# Patient Record
Sex: MALE | Race: WHITE | NOT HISPANIC OR LATINO | Employment: FULL TIME | URBAN - METROPOLITAN AREA
[De-identification: names, ages, dates, MRNs, and addresses within clinical notes are randomized per-mention and may not be internally consistent; named-entity substitution may affect disease eponyms.]

---

## 2018-09-12 ENCOUNTER — TELEPHONE (OUTPATIENT)
Dept: OBSTETRICS AND GYNECOLOGY | Facility: CLINIC | Age: 38
End: 2018-09-12

## 2018-09-12 DIAGNOSIS — N46.9 INFERTILITY MALE: Primary | ICD-10-CM

## 2018-09-21 ENCOUNTER — OFFICE VISIT (OUTPATIENT)
Dept: FAMILY MEDICINE | Facility: CLINIC | Age: 38
End: 2018-09-21
Payer: COMMERCIAL

## 2018-09-21 ENCOUNTER — PATIENT OUTREACH (OUTPATIENT)
Dept: ADMINISTRATIVE | Facility: HOSPITAL | Age: 38
End: 2018-09-21

## 2018-09-21 VITALS
OXYGEN SATURATION: 99 % | HEART RATE: 96 BPM | BODY MASS INDEX: 30.08 KG/M2 | SYSTOLIC BLOOD PRESSURE: 122 MMHG | WEIGHT: 259.94 LBS | TEMPERATURE: 96 F | DIASTOLIC BLOOD PRESSURE: 80 MMHG | HEIGHT: 78 IN

## 2018-09-21 DIAGNOSIS — E66.9 OBESITY (BMI 30-39.9): ICD-10-CM

## 2018-09-21 DIAGNOSIS — Z13.6 SCREENING FOR ISCHEMIC HEART DISEASE: ICD-10-CM

## 2018-09-21 DIAGNOSIS — Z00.00 ENCOUNTER FOR ANNUAL PHYSICAL EXAM: Primary | ICD-10-CM

## 2018-09-21 DIAGNOSIS — Z72.0 TOBACCO ABUSE: ICD-10-CM

## 2018-09-21 PROCEDURE — 99999 PR PBB SHADOW E&M-EST. PATIENT-LVL III: CPT | Mod: PBBFAC,,, | Performed by: FAMILY MEDICINE

## 2018-09-21 PROCEDURE — 90471 IMMUNIZATION ADMIN: CPT | Mod: S$GLB,,, | Performed by: FAMILY MEDICINE

## 2018-09-21 PROCEDURE — 99385 PREV VISIT NEW AGE 18-39: CPT | Mod: 25,S$GLB,, | Performed by: FAMILY MEDICINE

## 2018-09-21 PROCEDURE — 90686 IIV4 VACC NO PRSV 0.5 ML IM: CPT | Mod: S$GLB,,, | Performed by: FAMILY MEDICINE

## 2018-09-21 RX ORDER — BUPROPION HYDROCHLORIDE 150 MG/1
150 TABLET, EXTENDED RELEASE ORAL 2 TIMES DAILY
Qty: 60 TABLET | Refills: 1 | Status: SHIPPED | OUTPATIENT
Start: 2018-09-21 | End: 2019-05-06

## 2018-09-21 NOTE — PROGRESS NOTES
Subjective:       Patient ID: Billy Braga is a 37 y.o. male.    Chief Complaint: Follow-up      HPI Comments:       Current Outpatient Medications:     buPROPion (WELLBUTRIN SR) 150 MG TBSR 12 hr tablet, Take 1 tablet (150 mg total) by mouth 2 (two) times daily., Disp: 60 tablet, Rfl: 1      Encouraged by his wife to establish care for a well visit.  Current smoker.  Quit before with Wellbutrin would like to try again.  No other significant complaints at this time.    Past medical history no hospitalizations or surgery.  No current medications other than occasional melatonin, unknown dose.  Has insomnia that lasts all night about 2 to 3 times a month.  No connection with caffeine use or stresses/worries.    Lost 70 lb a few years ago on acute on diet.  Has gained about 30 back since then.  Exercises intermittently.   with 1 6-year-old son.    Family history father had colon cancer at age 65.  Grandfathers with prostatic and pancreatic cancers grandmother and mother with diabetes.  No coronary artery disease. Siblings with alcoholism    Smokes 1 pack per day.  No alcohol.  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     No urinary symptoms.  No abnormal bowel movements or blood in stool.  No abnormal breathing patterns while sleeping.  Rested in the morning on most days.      Review of Systems   Constitutional: Negative for activity change and unexpected weight change.   HENT: Negative for hearing loss, rhinorrhea and trouble swallowing.    Eyes: Negative for discharge and visual disturbance.   Respiratory: Negative for chest tightness and wheezing.    Cardiovascular: Negative for chest pain and palpitations.   Gastrointestinal: Negative for blood in stool, constipation, diarrhea and vomiting.   Endocrine: Negative for polydipsia and polyuria.   Genitourinary: Negative for difficulty urinating, hematuria and urgency.   Musculoskeletal: Negative for arthralgias, joint swelling and neck pain.  "  Neurological: Negative for weakness and headaches.   Psychiatric/Behavioral: Positive for sleep disturbance. Negative for confusion and dysphoric mood.       Objective:      Vitals:    09/21/18 0826   BP: 122/80   Pulse: 96   Temp: 96.3 °F (35.7 °C)   SpO2: 99%   Weight: 117.9 kg (259 lb 14.8 oz)   Height: 6' 6" (1.981 m)   PainSc: 0-No pain     Physical Exam   Constitutional: He is oriented to person, place, and time. He appears well-developed and well-nourished. No distress.   HENT:   Head: Normocephalic.   Mouth/Throat: No oropharyngeal exudate.   Neck: Neck supple. No thyromegaly present.   Cardiovascular: Normal rate, regular rhythm and normal heart sounds.   No murmur heard.  Pulmonary/Chest: Effort normal and breath sounds normal. He has no wheezes. He has no rales.   Abdominal: Soft. He exhibits no distension.   Musculoskeletal: He exhibits no edema.   Lymphadenopathy:     He has no cervical adenopathy.   Neurological: He is alert and oriented to person, place, and time.   Skin: Skin is warm and dry. He is not diaphoretic.   Psychiatric: He has a normal mood and affect. His behavior is normal. Judgment and thought content normal.   Nursing note and vitals reviewed.      Assessment:       1. Encounter for annual physical exam    2. Tobacco abuse    3. Obesity (BMI 30-39.9)    4. Screening for ischemic heart disease        Plan:   Encounter for annual physical exam  Comments:  CBC, CMP, TSH.  Flu shot.  Father was 65 when he was diagnosed with colon cancer:  Initial screening colonoscopy at age 51  Orders:  -     Comprehensive metabolic panel; Future; Expected date: 09/21/2018  -     CBC auto differential; Future; Expected date: 09/21/2018  -     TSH; Future; Expected date: 09/21/2018    Tobacco abuse  Comments:  Wellbutrin 150 b.i.d.    Obesity (BMI 30-39.9)  Comments:  recommended attention to diet and weight to keep BMI less than 30    Screening for ischemic heart disease  Comments:  Lipid " profile  Orders:  -     Lipid panel; Future; Expected date: 09/21/2018    Other orders  -     buPROPion (WELLBUTRIN SR) 150 MG TBSR 12 hr tablet; Take 1 tablet (150 mg total) by mouth 2 (two) times daily.  Dispense: 60 tablet; Refill: 1

## 2018-09-28 ENCOUNTER — LAB VISIT (OUTPATIENT)
Dept: LAB | Facility: HOSPITAL | Age: 38
End: 2018-09-28
Attending: FAMILY MEDICINE
Payer: COMMERCIAL

## 2018-09-28 DIAGNOSIS — Z00.00 ENCOUNTER FOR ANNUAL PHYSICAL EXAM: ICD-10-CM

## 2018-09-28 DIAGNOSIS — Z13.6 SCREENING FOR ISCHEMIC HEART DISEASE: ICD-10-CM

## 2018-09-28 LAB
ALBUMIN SERPL BCP-MCNC: 3.9 G/DL
ALP SERPL-CCNC: 72 U/L
ALT SERPL W/O P-5'-P-CCNC: 32 U/L
ANION GAP SERPL CALC-SCNC: 9 MMOL/L
AST SERPL-CCNC: 60 U/L
BASOPHILS # BLD AUTO: 0.03 K/UL
BASOPHILS NFR BLD: 0.4 %
BILIRUB SERPL-MCNC: 1.6 MG/DL
BUN SERPL-MCNC: 16 MG/DL
CALCIUM SERPL-MCNC: 9.3 MG/DL
CHLORIDE SERPL-SCNC: 108 MMOL/L
CHOLEST SERPL-MCNC: 212 MG/DL
CHOLEST/HDLC SERPL: 4.5 {RATIO}
CO2 SERPL-SCNC: 24 MMOL/L
CREAT SERPL-MCNC: 1 MG/DL
DIFFERENTIAL METHOD: ABNORMAL
EOSINOPHIL # BLD AUTO: 0.3 K/UL
EOSINOPHIL NFR BLD: 3.4 %
ERYTHROCYTE [DISTWIDTH] IN BLOOD BY AUTOMATED COUNT: 12.7 %
EST. GFR  (AFRICAN AMERICAN): >60 ML/MIN/1.73 M^2
EST. GFR  (NON AFRICAN AMERICAN): >60 ML/MIN/1.73 M^2
GLUCOSE SERPL-MCNC: 91 MG/DL
HCT VFR BLD AUTO: 46.8 %
HDLC SERPL-MCNC: 47 MG/DL
HDLC SERPL: 22.2 %
HGB BLD-MCNC: 15.8 G/DL
IMM GRANULOCYTES # BLD AUTO: 0.02 K/UL
IMM GRANULOCYTES NFR BLD AUTO: 0.3 %
LDLC SERPL CALC-MCNC: 144 MG/DL
LYMPHOCYTES # BLD AUTO: 2 K/UL
LYMPHOCYTES NFR BLD: 26 %
MCH RBC QN AUTO: 31.2 PG
MCHC RBC AUTO-ENTMCNC: 33.8 G/DL
MCV RBC AUTO: 93 FL
MONOCYTES # BLD AUTO: 0.4 K/UL
MONOCYTES NFR BLD: 5.5 %
NEUTROPHILS # BLD AUTO: 5 K/UL
NEUTROPHILS NFR BLD: 64.4 %
NONHDLC SERPL-MCNC: 165 MG/DL
NRBC BLD-RTO: 0 /100 WBC
PLATELET # BLD AUTO: 234 K/UL
PMV BLD AUTO: 11.4 FL
POTASSIUM SERPL-SCNC: 4 MMOL/L
PROT SERPL-MCNC: 7.3 G/DL
RBC # BLD AUTO: 5.06 M/UL
SODIUM SERPL-SCNC: 141 MMOL/L
TRIGL SERPL-MCNC: 105 MG/DL
TSH SERPL DL<=0.005 MIU/L-ACNC: 1.09 UIU/ML
WBC # BLD AUTO: 7.69 K/UL

## 2018-09-28 PROCEDURE — 36415 COLL VENOUS BLD VENIPUNCTURE: CPT | Mod: PO

## 2018-09-28 PROCEDURE — 80061 LIPID PANEL: CPT

## 2018-09-28 PROCEDURE — 80053 COMPREHEN METABOLIC PANEL: CPT

## 2018-09-28 PROCEDURE — 85025 COMPLETE CBC W/AUTO DIFF WBC: CPT

## 2018-09-28 PROCEDURE — 84443 ASSAY THYROID STIM HORMONE: CPT

## 2019-05-06 ENCOUNTER — OFFICE VISIT (OUTPATIENT)
Dept: FAMILY MEDICINE | Facility: CLINIC | Age: 39
End: 2019-05-06
Payer: COMMERCIAL

## 2019-05-06 VITALS
BODY MASS INDEX: 32.3 KG/M2 | HEART RATE: 87 BPM | SYSTOLIC BLOOD PRESSURE: 134 MMHG | OXYGEN SATURATION: 96 % | WEIGHT: 279.56 LBS | DIASTOLIC BLOOD PRESSURE: 84 MMHG | TEMPERATURE: 100 F

## 2019-05-06 DIAGNOSIS — L30.9 DERMATITIS: Primary | ICD-10-CM

## 2019-05-06 DIAGNOSIS — L03.116 CELLULITIS OF LEFT THIGH: ICD-10-CM

## 2019-05-06 PROCEDURE — 99213 PR OFFICE/OUTPT VISIT, EST, LEVL III, 20-29 MIN: ICD-10-PCS | Mod: S$GLB,,, | Performed by: NURSE PRACTITIONER

## 2019-05-06 PROCEDURE — 99213 OFFICE O/P EST LOW 20 MIN: CPT | Mod: S$GLB,,, | Performed by: NURSE PRACTITIONER

## 2019-05-06 PROCEDURE — 99999 PR PBB SHADOW E&M-EST. PATIENT-LVL III: ICD-10-PCS | Mod: PBBFAC,,, | Performed by: NURSE PRACTITIONER

## 2019-05-06 PROCEDURE — 99999 PR PBB SHADOW E&M-EST. PATIENT-LVL III: CPT | Mod: PBBFAC,,, | Performed by: NURSE PRACTITIONER

## 2019-05-06 RX ORDER — IBUPROFEN 800 MG/1
800 TABLET ORAL 3 TIMES DAILY
Qty: 30 TABLET | Refills: 1 | Status: SHIPPED | OUTPATIENT
Start: 2019-05-06 | End: 2020-08-28

## 2019-05-06 RX ORDER — SULFAMETHOXAZOLE AND TRIMETHOPRIM 800; 160 MG/1; MG/1
1 TABLET ORAL 2 TIMES DAILY
Qty: 14 TABLET | Refills: 0 | Status: SHIPPED | OUTPATIENT
Start: 2019-05-06 | End: 2020-08-28

## 2019-05-09 ENCOUNTER — OFFICE VISIT (OUTPATIENT)
Dept: FAMILY MEDICINE | Facility: CLINIC | Age: 39
End: 2019-05-09
Payer: COMMERCIAL

## 2019-05-09 VITALS
BODY MASS INDEX: 32.41 KG/M2 | DIASTOLIC BLOOD PRESSURE: 75 MMHG | OXYGEN SATURATION: 99 % | SYSTOLIC BLOOD PRESSURE: 144 MMHG | TEMPERATURE: 99 F | WEIGHT: 280.44 LBS | HEART RATE: 75 BPM

## 2019-05-09 DIAGNOSIS — L03.116 CELLULITIS OF LEFT THIGH: Primary | ICD-10-CM

## 2019-05-09 DIAGNOSIS — I83.12 VARICOSE VEINS OF BOTH LOWER EXTREMITIES WITH INFLAMMATION: ICD-10-CM

## 2019-05-09 DIAGNOSIS — Z72.0 TOBACCO ABUSE: ICD-10-CM

## 2019-05-09 DIAGNOSIS — I83.11 VARICOSE VEINS OF BOTH LOWER EXTREMITIES WITH INFLAMMATION: ICD-10-CM

## 2019-05-09 PROCEDURE — 99999 PR PBB SHADOW E&M-EST. PATIENT-LVL III: CPT | Mod: PBBFAC,,, | Performed by: NURSE PRACTITIONER

## 2019-05-09 PROCEDURE — 99213 PR OFFICE/OUTPT VISIT, EST, LEVL III, 20-29 MIN: ICD-10-PCS | Mod: S$GLB,,, | Performed by: NURSE PRACTITIONER

## 2019-05-09 PROCEDURE — 99213 OFFICE O/P EST LOW 20 MIN: CPT | Mod: S$GLB,,, | Performed by: NURSE PRACTITIONER

## 2019-05-09 PROCEDURE — 99999 PR PBB SHADOW E&M-EST. PATIENT-LVL III: ICD-10-PCS | Mod: PBBFAC,,, | Performed by: NURSE PRACTITIONER

## 2019-05-11 NOTE — PROGRESS NOTES
Subjective:       Patient ID: Billy Braga is a 38 y.o. male.    Chief Complaint: Rash  Pt reports to clinic with chief complaint of painful rash to right upper thigh.  Onset 3 days ago, pain worsening and awakening from sleep.  Denies injury or trauma.  No contact with new chemicals, plants or animals.    HPI  Review of Systems   Constitutional: Negative.    Respiratory: Negative.    Cardiovascular: Negative.    Gastrointestinal: Negative.    Genitourinary: Negative.    Musculoskeletal: Positive for myalgias.   Skin: Positive for color change and rash.       Objective:      Physical Exam   Constitutional: He is oriented to person, place, and time. He appears well-developed and well-nourished.   HENT:   Head: Normocephalic.   Eyes: EOM are normal.   Neck: Neck supple.   Cardiovascular: Normal rate and normal heart sounds.   Pulmonary/Chest: Effort normal and breath sounds normal.   Neurological: He is alert and oriented to person, place, and time.   Skin: There is erythema.        Varicose veins, erythematous, warm to touch, no induration    Vitals reviewed.      Assessment:       1. Dermatitis    2. Cellulitis of left thigh        Plan:   Dermatitis  -     ibuprofen (ADVIL,MOTRIN) 800 MG tablet; Take 1 tablet (800 mg total) by mouth 3 (three) times daily.  Dispense: 30 tablet; Refill: 1  -     sulfamethoxazole-trimethoprim 800-160mg (BACTRIM DS) 800-160 mg Tab; Take 1 tablet by mouth 2 (two) times daily.  Dispense: 14 tablet; Refill: 0    Cellulitis of left thigh  -     ibuprofen (ADVIL,MOTRIN) 800 MG tablet; Take 1 tablet (800 mg total) by mouth 3 (three) times daily.  Dispense: 30 tablet; Refill: 1  -     sulfamethoxazole-trimethoprim 800-160mg (BACTRIM DS) 800-160 mg Tab; Take 1 tablet by mouth 2 (two) times daily.  Dispense: 14 tablet; Refill: 0      Follow up in about 3 days (around 5/9/2019).

## 2019-05-11 NOTE — PROGRESS NOTES
Subjective:       Patient ID: Billy Braga is a 38 y.o. male.    Chief Complaint: Follow-up  pt reports to clinic for follow up evaluation.  Treated 3 days ago for cellulitis and varicose veins inflammation.  Pt reports erythema has decreased.  Denies fever chills.    HPI  Review of Systems   Constitutional: Negative for chills and fever.   HENT: Negative.    Respiratory: Negative.    Cardiovascular: Negative.    Gastrointestinal: Negative.    Skin: Positive for color change.       Objective:      Physical Exam   Constitutional: He is oriented to person, place, and time. He appears well-developed and well-nourished.   HENT:   Head: Normocephalic.   Eyes: EOM are normal.   Neck: Neck supple.   Cardiovascular: Normal rate and normal heart sounds.   Pulmonary/Chest: Effort normal and breath sounds normal.   Neurological: He is alert and oriented to person, place, and time.   Skin:        Decreased erythema from previous, cool to touch, non tender, varicose veins   Vitals reviewed.      Assessment:       1. Cellulitis of left thigh    2. Varicose veins of both lower extremities with inflammation        Plan:   Cellulitis of left thigh  Improving. Complete bactrim.    Varicose veins of both lower extremities with inflammation  Compression stockings advised.  Follow up with PCP for vascular evaluation     No follow-ups on file.

## 2019-10-25 ENCOUNTER — CLINICAL SUPPORT (OUTPATIENT)
Dept: FAMILY MEDICINE | Facility: CLINIC | Age: 39
End: 2019-10-25
Payer: COMMERCIAL

## 2019-10-25 ENCOUNTER — HOSPITAL ENCOUNTER (OUTPATIENT)
Dept: RADIOLOGY | Facility: HOSPITAL | Age: 39
Discharge: HOME | End: 2019-10-25
Attending: FAMILY MEDICINE
Payer: COMMERCIAL

## 2019-10-25 ENCOUNTER — OFFICE VISIT (OUTPATIENT)
Dept: FAMILY MEDICINE | Facility: CLINIC | Age: 39
End: 2019-10-25
Payer: COMMERCIAL

## 2019-10-25 VITALS
HEART RATE: 77 BPM | WEIGHT: 277.88 LBS | OXYGEN SATURATION: 98 % | BODY MASS INDEX: 32.15 KG/M2 | SYSTOLIC BLOOD PRESSURE: 139 MMHG | HEIGHT: 78 IN | TEMPERATURE: 98 F | DIASTOLIC BLOOD PRESSURE: 89 MMHG

## 2019-10-25 DIAGNOSIS — G89.29 CHRONIC RIGHT HIP PAIN: Primary | ICD-10-CM

## 2019-10-25 DIAGNOSIS — E66.9 OBESITY (BMI 30-39.9): ICD-10-CM

## 2019-10-25 DIAGNOSIS — Z72.0 TOBACCO ABUSE: ICD-10-CM

## 2019-10-25 DIAGNOSIS — Z00.00 ENCOUNTER FOR ANNUAL PHYSICAL EXAM: Primary | ICD-10-CM

## 2019-10-25 DIAGNOSIS — Z23 NEED FOR VACCINATION: ICD-10-CM

## 2019-10-25 DIAGNOSIS — M25.551 RIGHT HIP PAIN: ICD-10-CM

## 2019-10-25 DIAGNOSIS — M25.551 CHRONIC RIGHT HIP PAIN: Primary | ICD-10-CM

## 2019-10-25 DIAGNOSIS — R17 ELEVATED BILIRUBIN: ICD-10-CM

## 2019-10-25 DIAGNOSIS — E78.5 HYPERLIPIDEMIA, UNSPECIFIED HYPERLIPIDEMIA TYPE: ICD-10-CM

## 2019-10-25 PROCEDURE — 90686 FLU VACCINE (QUAD) GREATER THAN OR EQUAL TO 3YO PRESERVATIVE FREE IM: ICD-10-PCS | Mod: S$GLB,,, | Performed by: FAMILY MEDICINE

## 2019-10-25 PROCEDURE — 73521 X-RAY EXAM HIPS BI 2 VIEWS: CPT | Mod: TC,PO

## 2019-10-25 PROCEDURE — 90686 IIV4 VACC NO PRSV 0.5 ML IM: CPT | Mod: S$GLB,,, | Performed by: FAMILY MEDICINE

## 2019-10-25 PROCEDURE — 90715 TDAP VACCINE 7 YRS/> IM: CPT | Mod: S$GLB,,, | Performed by: FAMILY MEDICINE

## 2019-10-25 PROCEDURE — 90471 TDAP VACCINE GREATER THAN OR EQUAL TO 7YO IM: ICD-10-PCS | Mod: S$GLB,,, | Performed by: FAMILY MEDICINE

## 2019-10-25 PROCEDURE — 73521 X-RAY EXAM HIPS BI 2 VIEWS: CPT | Mod: 26,,, | Performed by: RADIOLOGY

## 2019-10-25 PROCEDURE — 90472 IMMUNIZATION ADMIN EACH ADD: CPT | Mod: S$GLB,,, | Performed by: FAMILY MEDICINE

## 2019-10-25 PROCEDURE — 90732 PPSV23 VACC 2 YRS+ SUBQ/IM: CPT | Mod: S$GLB,,, | Performed by: FAMILY MEDICINE

## 2019-10-25 PROCEDURE — 99999 PR PBB SHADOW E&M-EST. PATIENT-LVL III: CPT | Mod: PBBFAC,,, | Performed by: FAMILY MEDICINE

## 2019-10-25 PROCEDURE — 90472 PNEUMOCOCCAL POLYSACCHARIDE VACCINE 23-VALENT =>2YO SQ IM: ICD-10-PCS | Mod: S$GLB,,, | Performed by: FAMILY MEDICINE

## 2019-10-25 PROCEDURE — 99999 PR PBB SHADOW E&M-EST. PATIENT-LVL III: ICD-10-PCS | Mod: PBBFAC,,, | Performed by: FAMILY MEDICINE

## 2019-10-25 PROCEDURE — 99395 PR PREVENTIVE VISIT,EST,18-39: ICD-10-PCS | Mod: 25,S$GLB,, | Performed by: FAMILY MEDICINE

## 2019-10-25 PROCEDURE — 90732 PNEUMOCOCCAL POLYSACCHARIDE VACCINE 23-VALENT =>2YO SQ IM: ICD-10-PCS | Mod: S$GLB,,, | Performed by: FAMILY MEDICINE

## 2019-10-25 PROCEDURE — 99395 PREV VISIT EST AGE 18-39: CPT | Mod: 25,S$GLB,, | Performed by: FAMILY MEDICINE

## 2019-10-25 PROCEDURE — 73521 XR HIPS BILATERAL 2 VIEW INCL AP PELVIS: ICD-10-PCS | Mod: 26,,, | Performed by: RADIOLOGY

## 2019-10-25 PROCEDURE — 90471 IMMUNIZATION ADMIN: CPT | Mod: S$GLB,,, | Performed by: FAMILY MEDICINE

## 2019-10-25 PROCEDURE — 90715 TDAP VACCINE GREATER THAN OR EQUAL TO 7YO IM: ICD-10-PCS | Mod: S$GLB,,, | Performed by: FAMILY MEDICINE

## 2019-10-25 RX ORDER — BUPROPION HYDROCHLORIDE 150 MG/1
150 TABLET, EXTENDED RELEASE ORAL 2 TIMES DAILY
Qty: 60 TABLET | Refills: 11 | Status: SHIPPED | OUTPATIENT
Start: 2019-10-25 | End: 2020-08-28

## 2019-10-25 NOTE — PROGRESS NOTES
Subjective:       Patient ID: Billy Braga is a 39 y.o. male.    Chief Complaint: Annual Exam      HPI Comments:       Current Outpatient Medications:     buPROPion (WELLBUTRIN SR) 150 MG TBSR 12 hr tablet, Take 1 tablet (150 mg total) by mouth 2 (two) times daily., Disp: 60 tablet, Rfl: 11    ibuprofen (ADVIL,MOTRIN) 800 MG tablet, Take 1 tablet (800 mg total) by mouth 3 (three) times daily., Disp: 30 tablet, Rfl: 1    sulfamethoxazole-trimethoprim 800-160mg (BACTRIM DS) 800-160 mg Tab, Take 1 tablet by mouth 2 (two) times daily., Disp: 14 tablet, Rfl: 0      Here for annual physical.  Has gained a lot of weight this year.  Thoughts of stress trying to get custody of his son from his ex-wife, which he was able to accomplish.  Says is the hardest time of his life.  Now doing bit better.  Wants to work on his diet and exercise again.  Has been successful with large weight loss in the distant past.    Also complains of 6 a months of right hip pain.  Hurts right over the lateral point.  No injury.  Family history of hip problems and surgeries (father).  Takes Advil 600 mg which helps for a little while.    Still smoking 1 pack per day.  Wellbutrin helped him quit for about 3 months.  Noticed that he was somewhat depressed when he got off of it.  Had no mood problems prior to that.  Wants to try it again.  Today we talked about weaning it rather than discontinuing it abruptly when he gets that point.    Review of Systems   Constitutional: Negative for activity change and unexpected weight change.   HENT: Negative for hearing loss, rhinorrhea and trouble swallowing.    Eyes: Negative for discharge and visual disturbance.   Respiratory: Negative for chest tightness and wheezing.    Cardiovascular: Negative for chest pain and palpitations.   Gastrointestinal: Negative for blood in stool, constipation, diarrhea and vomiting.   Endocrine: Negative for polydipsia and polyuria.   Genitourinary: Negative for difficulty  "urinating, hematuria and urgency.   Musculoskeletal: Positive for arthralgias. Negative for joint swelling and neck pain.   Neurological: Negative for weakness and headaches.   Psychiatric/Behavioral: Negative for confusion and dysphoric mood.       Objective:      Vitals:    10/25/19 0808   BP: 139/89   Pulse: 77   Temp: 97.9 °F (36.6 °C)   TempSrc: Tympanic   SpO2: 98%   Weight: 126.1 kg (277 lb 14.2 oz)   Height: 6' 6" (1.981 m)   PainSc:   4   PainLoc: Hip     Physical Exam   Constitutional: He is oriented to person, place, and time. He appears well-developed and well-nourished. No distress.   HENT:   Head: Normocephalic.   Mouth/Throat: No oropharyngeal exudate.   Neck: Neck supple. No thyromegaly present.   Cardiovascular: Normal rate, regular rhythm and normal heart sounds.   No murmur heard.  Pulmonary/Chest: Effort normal and breath sounds normal. He has no wheezes. He has no rales.   Abdominal: Soft. He exhibits no distension and no mass. There is no hepatosplenomegaly. There is no tenderness.   Musculoskeletal: He exhibits no edema.        Right hip: He exhibits bony tenderness. He exhibits normal range of motion, normal strength, no tenderness and no crepitus.        Legs:  Lymphadenopathy:     He has no cervical adenopathy.   Neurological: He is alert and oriented to person, place, and time.   Skin: Skin is warm and dry. He is not diaphoretic.   Psychiatric: He has a normal mood and affect. His behavior is normal. Judgment and thought content normal.   Nursing note and vitals reviewed.      Assessment:       1. Encounter for annual physical exam    2. Tobacco abuse    3. Hyperlipidemia, unspecified hyperlipidemia type    4. Need for vaccination    5. Obesity (BMI 30-39.9)    6. Right hip pain    7. Elevated bilirubin        Plan:   Encounter for annual physical exam  Comments:  Significant weight gain.  Borderline blood pressure.  Discussed smoking cessation  Orders:  -     Comprehensive metabolic " panel; Future; Expected date: 10/25/2019    Tobacco abuse  Comments:  Restart Wellbutrin as before    Hyperlipidemia, unspecified hyperlipidemia type  Comments:  Fasting lipid profile  Orders:  -     Lipid panel; Future; Expected date: 10/25/2019    Need for vaccination  Comments:  Tdap and Pneumovax today.  Will get flu shot later  Orders:  -     (In Office Administered) Pneumococcal Polysaccharide Vaccine (23 Valent) (SQ/IM)  -     (In Office Administered) Tdap Vaccine    Obesity (BMI 30-39.9)  Comments:  Challenged patient to work on diet and exercise.  Significant weight gain since last year    Right hip pain  Comments:  Declines Rx today.  X-rays today.  Probable orthopedic or PT referral  Orders:  -     X-Ray Hips Bilateral 2 View Incl AP Pelvis; Future; Expected date: 10/25/2019    Elevated bilirubin  Comments:  Recheck today    Other orders  -     buPROPion (WELLBUTRIN SR) 150 MG TBSR 12 hr tablet; Take 1 tablet (150 mg total) by mouth 2 (two) times daily.  Dispense: 60 tablet; Refill: 11

## 2019-10-25 NOTE — PROGRESS NOTES
Subjective:       Patient ID: Billy Braga is a 39 y.o. male.    Chief Complaint: No chief complaint on file.      HPI Comments:       Current Outpatient Medications:     buPROPion (WELLBUTRIN SR) 150 MG TBSR 12 hr tablet, Take 1 tablet (150 mg total) by mouth 2 (two) times daily., Disp: 60 tablet, Rfl: 11    ibuprofen (ADVIL,MOTRIN) 800 MG tablet, Take 1 tablet (800 mg total) by mouth 3 (three) times daily., Disp: 30 tablet, Rfl: 1    sulfamethoxazole-trimethoprim 800-160mg (BACTRIM DS) 800-160 mg Tab, Take 1 tablet by mouth 2 (two) times daily., Disp: 14 tablet, Rfl: 0  HPI  Review of Systems    Objective:      There were no vitals filed for this visit.  Physical Exam    Assessment:       1. Chronic right hip pain        Plan:   Chronic right hip pain  -     Ambulatory referral/consult to Orthopedics; Future; Expected date: 10/25/2019

## 2020-07-31 ENCOUNTER — OFFICE VISIT (OUTPATIENT)
Dept: FAMILY MEDICINE | Facility: CLINIC | Age: 40
End: 2020-07-31
Payer: COMMERCIAL

## 2020-07-31 ENCOUNTER — LAB VISIT (OUTPATIENT)
Dept: LAB | Facility: HOSPITAL | Age: 40
End: 2020-07-31
Attending: FAMILY MEDICINE
Payer: COMMERCIAL

## 2020-07-31 VITALS
BODY MASS INDEX: 34.05 KG/M2 | SYSTOLIC BLOOD PRESSURE: 156 MMHG | DIASTOLIC BLOOD PRESSURE: 90 MMHG | OXYGEN SATURATION: 99 % | HEART RATE: 88 BPM | WEIGHT: 294.31 LBS | TEMPERATURE: 99 F | HEIGHT: 78 IN | RESPIRATION RATE: 18 BRPM

## 2020-07-31 DIAGNOSIS — Z11.59 NEED FOR HEPATITIS C SCREENING TEST: ICD-10-CM

## 2020-07-31 DIAGNOSIS — Z72.0 TOBACCO ABUSE: ICD-10-CM

## 2020-07-31 DIAGNOSIS — R79.89 ABNORMAL LIVER FUNCTION TEST: ICD-10-CM

## 2020-07-31 DIAGNOSIS — E66.9 OBESITY (BMI 30-39.9): ICD-10-CM

## 2020-07-31 DIAGNOSIS — I10 ESSENTIAL HYPERTENSION: ICD-10-CM

## 2020-07-31 DIAGNOSIS — Z11.4 SCREENING FOR HIV (HUMAN IMMUNODEFICIENCY VIRUS): ICD-10-CM

## 2020-07-31 DIAGNOSIS — M25.551 CHRONIC RIGHT HIP PAIN: ICD-10-CM

## 2020-07-31 DIAGNOSIS — R07.89 ATYPICAL CHEST PAIN: Primary | ICD-10-CM

## 2020-07-31 DIAGNOSIS — G89.29 CHRONIC RIGHT HIP PAIN: ICD-10-CM

## 2020-07-31 LAB
ALBUMIN SERPL BCP-MCNC: 4.1 G/DL (ref 3.5–5.2)
ALP SERPL-CCNC: 79 U/L (ref 55–135)
ALT SERPL W/O P-5'-P-CCNC: 81 U/L (ref 10–44)
ANION GAP SERPL CALC-SCNC: 9 MMOL/L (ref 8–16)
AST SERPL-CCNC: 38 U/L (ref 10–40)
BILIRUB SERPL-MCNC: 1.4 MG/DL (ref 0.1–1)
BUN SERPL-MCNC: 11 MG/DL (ref 6–20)
CALCIUM SERPL-MCNC: 9.3 MG/DL (ref 8.7–10.5)
CHLORIDE SERPL-SCNC: 108 MMOL/L (ref 95–110)
CO2 SERPL-SCNC: 25 MMOL/L (ref 23–29)
CREAT SERPL-MCNC: 1 MG/DL (ref 0.5–1.4)
EST. GFR  (AFRICAN AMERICAN): >60 ML/MIN/1.73 M^2
EST. GFR  (NON AFRICAN AMERICAN): >60 ML/MIN/1.73 M^2
GLUCOSE SERPL-MCNC: 97 MG/DL (ref 70–110)
POTASSIUM SERPL-SCNC: 4.7 MMOL/L (ref 3.5–5.1)
PROT SERPL-MCNC: 7.5 G/DL (ref 6–8.4)
SODIUM SERPL-SCNC: 142 MMOL/L (ref 136–145)

## 2020-07-31 PROCEDURE — 93010 EKG 12-LEAD: ICD-10-PCS | Mod: S$GLB,,, | Performed by: INTERNAL MEDICINE

## 2020-07-31 PROCEDURE — 99999 PR PBB SHADOW E&M-EST. PATIENT-LVL III: CPT | Mod: PBBFAC,,, | Performed by: FAMILY MEDICINE

## 2020-07-31 PROCEDURE — 93005 ELECTROCARDIOGRAM TRACING: CPT | Mod: S$GLB,,, | Performed by: FAMILY MEDICINE

## 2020-07-31 PROCEDURE — 80053 COMPREHEN METABOLIC PANEL: CPT

## 2020-07-31 PROCEDURE — 93005 EKG 12-LEAD: ICD-10-PCS | Mod: S$GLB,,, | Performed by: FAMILY MEDICINE

## 2020-07-31 PROCEDURE — 93010 ELECTROCARDIOGRAM REPORT: CPT | Mod: S$GLB,,, | Performed by: INTERNAL MEDICINE

## 2020-07-31 PROCEDURE — 99999 PR PBB SHADOW E&M-EST. PATIENT-LVL III: ICD-10-PCS | Mod: PBBFAC,,, | Performed by: FAMILY MEDICINE

## 2020-07-31 PROCEDURE — 86803 HEPATITIS C AB TEST: CPT

## 2020-07-31 PROCEDURE — 86703 HIV-1/HIV-2 1 RESULT ANTBDY: CPT

## 2020-07-31 PROCEDURE — 36415 COLL VENOUS BLD VENIPUNCTURE: CPT | Mod: PO

## 2020-07-31 PROCEDURE — 99214 OFFICE O/P EST MOD 30 MIN: CPT | Mod: S$GLB,,, | Performed by: FAMILY MEDICINE

## 2020-07-31 PROCEDURE — 99214 PR OFFICE/OUTPT VISIT, EST, LEVL IV, 30-39 MIN: ICD-10-PCS | Mod: S$GLB,,, | Performed by: FAMILY MEDICINE

## 2020-07-31 RX ORDER — LOSARTAN POTASSIUM 50 MG/1
50 TABLET ORAL DAILY
Qty: 30 TABLET | Refills: 1 | Status: SHIPPED | OUTPATIENT
Start: 2020-07-31 | End: 2020-08-28 | Stop reason: SDUPTHER

## 2020-07-31 NOTE — PROGRESS NOTES
Subjective:       Patient ID: Billy Braga is a 39 y.o. male.    Chief Complaint: No chief complaint on file.      HPI Comments:       Current Outpatient Medications:     buPROPion (WELLBUTRIN SR) 150 MG TBSR 12 hr tablet, Take 1 tablet (150 mg total) by mouth 2 (two) times daily., Disp: 60 tablet, Rfl: 11    ibuprofen (ADVIL,MOTRIN) 800 MG tablet, Take 1 tablet (800 mg total) by mouth 3 (three) times daily., Disp: 30 tablet, Rfl: 1    losartan (COZAAR) 50 MG tablet, Take 1 tablet (50 mg total) by mouth once daily., Disp: 30 tablet, Rfl: 1    sulfamethoxazole-trimethoprim 800-160mg (BACTRIM DS) 800-160 mg Tab, Take 1 tablet by mouth 2 (two) times daily., Disp: 14 tablet, Rfl: 0      Had a constant, sharp pain in his left anterior chest said it felt like a pulled muscle.  Nonexertional.  No associated shortness of breath, nausea, diaphoresis.  Was there for about 2-3 weeks and now has gone away for the most part in the last 3 days.  No injury or strain in the beginning.  No cough, URI symptoms, fever chills.    Family history:  No coronary artery disease, grandfather and father with prostate cancer; maternal grandfather with liver cancer.  Grandmother with diabetes.    Right hip pain I saw him with last time resolved.  Did not need to see orthopedics.    Quit at least twice on Wellbutrin since I last saw him.  Always gained some weight.  He has gains over 20 lb overall.  Now is smoking a little less than a pack per day and he is no longer taking Wellbutrin.  Looking for another solution.    Review of Systems   Constitutional: Negative for activity change and unexpected weight change.   HENT: Negative for hearing loss, rhinorrhea and trouble swallowing.    Eyes: Negative for discharge and visual disturbance.   Respiratory: Positive for chest tightness. Negative for wheezing.    Cardiovascular: Positive for chest pain and palpitations.   Gastrointestinal: Negative for blood in stool, constipation, diarrhea and  "vomiting.   Endocrine: Negative for polydipsia and polyuria.   Genitourinary: Negative for difficulty urinating, hematuria and urgency.   Musculoskeletal: Negative for arthralgias, joint swelling and neck pain.   Neurological: Positive for headaches. Negative for weakness.   Psychiatric/Behavioral: Negative for confusion and dysphoric mood.       Objective:      Vitals:    07/31/20 0825   BP: (!) 156/90   Pulse: 88   Resp: 18   Temp: 98.6 °F (37 °C)   TempSrc: Tympanic   SpO2: 99%   Weight: 133.5 kg (294 lb 5 oz)   Height: 6' 6" (1.981 m)   PainSc: 0-No pain     Physical Exam  Vitals signs and nursing note reviewed.   Constitutional:       General: He is not in acute distress.     Appearance: He is well-developed. He is not diaphoretic.      Comments: EKG:  Normal sinus rhythm   HENT:      Head: Normocephalic.   Neck:      Musculoskeletal: Neck supple.      Thyroid: No thyromegaly.   Cardiovascular:      Rate and Rhythm: Normal rate and regular rhythm.      Heart sounds: Normal heart sounds. No murmur.   Pulmonary:      Effort: Pulmonary effort is normal.      Breath sounds: Normal breath sounds. No wheezing or rales.   Chest:      Chest wall: Tenderness and crepitus present. No mass, deformity or swelling.       Abdominal:      General: There is no distension.      Palpations: Abdomen is soft.   Lymphadenopathy:      Cervical: No cervical adenopathy.   Skin:     General: Skin is warm and dry.   Neurological:      Mental Status: He is alert and oriented to person, place, and time.   Psychiatric:         Behavior: Behavior normal.         Thought Content: Thought content normal.         Judgment: Judgment normal.         Assessment:       1. Atypical chest pain    2. Tobacco abuse    3. Screening for HIV (human immunodeficiency virus)    4. Need for hepatitis C screening test    5. Chronic right hip pain    6. Abnormal liver function test    7. Obesity (BMI 30-39.9)    8. Essential hypertension        Plan: "   Atypical chest pain  -     EKG 12-lead    Tobacco abuse  Comments:  Will discuss next steps follow-up visit.?  Chantix    Screening for HIV (human immunodeficiency virus)  Comments:  HIV ordered  Orders:  -     HIV 1/2 Ag/Ab (4th Gen); Future; Expected date: 07/31/2020    Need for hepatitis C screening test  Comments:  Hep C ordered  Orders:  -     Hepatitis C Antibody; Future; Expected date: 07/31/2020    Chronic right hip pain  Comments:  Resolved spontaneously    Abnormal liver function test  Comments:  Repeat today  Orders:  -     Comprehensive metabolic panel; Future; Expected date: 07/31/2020    Obesity (BMI 30-39.9)  Comments:  Significant weight gain over the last year    Essential hypertension  Comments:  Uncontrolled.  Start antihypertensive medication and follow-up in 1 month    Other orders  -     losartan (COZAAR) 50 MG tablet; Take 1 tablet (50 mg total) by mouth once daily.  Dispense: 30 tablet; Refill: 1

## 2020-08-03 LAB
HCV AB SERPL QL IA: NEGATIVE
HIV 1+2 AB+HIV1 P24 AG SERPL QL IA: NEGATIVE

## 2020-08-28 ENCOUNTER — OFFICE VISIT (OUTPATIENT)
Dept: FAMILY MEDICINE | Facility: CLINIC | Age: 40
End: 2020-08-28
Payer: COMMERCIAL

## 2020-08-28 ENCOUNTER — HOSPITAL ENCOUNTER (OUTPATIENT)
Dept: RADIOLOGY | Facility: HOSPITAL | Age: 40
Discharge: HOME | End: 2020-08-28
Attending: FAMILY MEDICINE
Payer: COMMERCIAL

## 2020-08-28 VITALS
DIASTOLIC BLOOD PRESSURE: 90 MMHG | SYSTOLIC BLOOD PRESSURE: 132 MMHG | TEMPERATURE: 99 F | HEART RATE: 71 BPM | OXYGEN SATURATION: 97 % | BODY MASS INDEX: 33.96 KG/M2 | WEIGHT: 293.88 LBS

## 2020-08-28 DIAGNOSIS — Z72.0 TOBACCO ABUSE: ICD-10-CM

## 2020-08-28 DIAGNOSIS — R07.89 ATYPICAL CHEST PAIN: ICD-10-CM

## 2020-08-28 DIAGNOSIS — R79.89 ABNORMAL LIVER FUNCTION TEST: ICD-10-CM

## 2020-08-28 DIAGNOSIS — I10 ESSENTIAL HYPERTENSION: Primary | ICD-10-CM

## 2020-08-28 PROCEDURE — 99214 OFFICE O/P EST MOD 30 MIN: CPT | Mod: S$GLB,,, | Performed by: FAMILY MEDICINE

## 2020-08-28 PROCEDURE — 99999 PR PBB SHADOW E&M-EST. PATIENT-LVL III: CPT | Mod: PBBFAC,,, | Performed by: FAMILY MEDICINE

## 2020-08-28 PROCEDURE — 71046 X-RAY EXAM CHEST 2 VIEWS: CPT | Mod: 26,,, | Performed by: RADIOLOGY

## 2020-08-28 PROCEDURE — 71046 XR CHEST PA AND LATERAL: ICD-10-PCS | Mod: 26,,, | Performed by: RADIOLOGY

## 2020-08-28 PROCEDURE — 99214 PR OFFICE/OUTPT VISIT, EST, LEVL IV, 30-39 MIN: ICD-10-PCS | Mod: S$GLB,,, | Performed by: FAMILY MEDICINE

## 2020-08-28 PROCEDURE — 99999 PR PBB SHADOW E&M-EST. PATIENT-LVL III: ICD-10-PCS | Mod: PBBFAC,,, | Performed by: FAMILY MEDICINE

## 2020-08-28 PROCEDURE — 71046 X-RAY EXAM CHEST 2 VIEWS: CPT | Mod: TC,PO

## 2020-08-28 RX ORDER — CELECOXIB 100 MG/1
100 CAPSULE ORAL DAILY
Qty: 30 CAPSULE | Refills: 1 | Status: SHIPPED | OUTPATIENT
Start: 2020-08-28 | End: 2022-04-22

## 2020-08-28 RX ORDER — VARENICLINE TARTRATE 1 MG/1
TABLET, FILM COATED ORAL
Qty: 30 TABLET | Refills: 1 | Status: SHIPPED | OUTPATIENT
Start: 2020-08-28 | End: 2020-08-31 | Stop reason: SDUPTHER

## 2020-08-28 NOTE — PROGRESS NOTES
Subjective:       Patient ID: Billy Braga is a 39 y.o. male.    Chief Complaint: No chief complaint on file.      HPI Comments:       Current Outpatient Medications:     celecoxib (CELEBREX) 100 MG capsule, Take 1 capsule (100 mg total) by mouth once daily., Disp: 30 capsule, Rfl: 1    losartan (COZAAR) 100 MG Tab, Take 1 tablet (100 mg total) by mouth once daily., Disp: 30 tablet, Rfl: 1    varenicline (CHANTIX) 1 mg Tab, 30, Disp: 30 tablet, Rfl: 1      One-month follow-up on uncontrolled hypertension.  He has been taking losartan for the past 2 or 3 weeks.  Occasionally forgets to take it.  No blood pressure readings elsewhere.    Still has this sharp left anterior chest pain.  Very localized to an area just left of the sternum overlying the 3rd to 4th rib.  Says he can reproduce it if he presses very hard.  Tends to last all day.  No radiation.  Somewhat exertional, but does not wax or wane too much depending on his activities.  Some days he does not get at all.  More than a few times a week.    Wants to quit smoking.  Wellbutrin worked in the past but he always became depressed when he stop the medication.  Does not want to do that again.  Wife is now pregnant and he wants to quit once and for all.    ALT now in the 80s.  No Tylenol or alcohol in the picture    Review of Systems   Constitutional: Negative for activity change and unexpected weight change.   HENT: Negative for hearing loss, rhinorrhea and trouble swallowing.    Eyes: Negative for discharge and visual disturbance.   Respiratory: Negative for chest tightness and wheezing.    Cardiovascular: Positive for chest pain. Negative for palpitations.   Gastrointestinal: Negative for blood in stool, constipation, diarrhea and vomiting.   Endocrine: Negative for polydipsia and polyuria.   Genitourinary: Negative for difficulty urinating, hematuria and urgency.   Musculoskeletal: Negative for arthralgias, joint swelling and neck pain.   Neurological:  Negative for weakness and headaches.   Psychiatric/Behavioral: Negative for confusion and dysphoric mood.       Objective:      Vitals:    08/28/20 0849   BP: (!) 132/90   Pulse: 71   Temp: 98.8 °F (37.1 °C)   TempSrc: Temporal   SpO2: 97%   Weight: 133.3 kg (293 lb 14 oz)   PainSc:   2   PainLoc: Chest     Physical Exam  Vitals signs and nursing note reviewed.   Constitutional:       General: He is not in acute distress.     Appearance: He is well-developed. He is not diaphoretic.   HENT:      Head: Normocephalic.      Mouth/Throat:      Pharynx: No oropharyngeal exudate.   Neck:      Musculoskeletal: Neck supple.      Thyroid: No thyromegaly.   Cardiovascular:      Rate and Rhythm: Normal rate and regular rhythm.      Heart sounds: Normal heart sounds. No murmur.   Pulmonary:      Effort: Pulmonary effort is normal.      Breath sounds: Normal breath sounds. No wheezing or rales.   Chest:      Breasts: Breasts are symmetrical.         Left: No swelling or mass.       Abdominal:      General: There is no distension.      Palpations: Abdomen is soft.   Lymphadenopathy:      Cervical: No cervical adenopathy.   Skin:     General: Skin is warm and dry.   Neurological:      Mental Status: He is alert and oriented to person, place, and time.   Psychiatric:         Behavior: Behavior normal.         Thought Content: Thought content normal.         Judgment: Judgment normal.         Assessment:       1. Essential hypertension    2. Tobacco abuse    3. Abnormal liver function test    4. Atypical chest pain        Plan:   Essential hypertension  Comments:  Still not at control.  Increase to 100 mg daily.  Follow-up nurse visit in 2 weeks.  Follow-up office visit 3 months  Orders:  -     Comprehensive metabolic panel; Future; Expected date: 08/28/2020    Tobacco abuse  Comments:  Chantix prescribed.  Notify me with concerns or problems to the portal.  Reviewed potential side effects    Abnormal liver function  test  Comments:  Increasing, though mild, elevation of ALT.  Viral hepatitis panel.  Right upper quadrant ultrasound  Orders:  -     Hepatitis Panel, Acute; Future; Expected date: 08/28/2020  -     US Abdomen Limited; Future; Expected date: 08/28/2020  -     Comprehensive metabolic panel; Future; Expected date: 08/28/2020    Atypical chest pain  Comments:  Trial of Celebrex daily.  Contact me with noteworthy results through portal  Orders:  -     X-Ray Chest PA And Lateral; Future; Expected date: 08/28/2020    Other orders  -     losartan (COZAAR) 100 MG Tab; Take 1 tablet (100 mg total) by mouth once daily.  Dispense: 30 tablet; Refill: 1  -     celecoxib (CELEBREX) 100 MG capsule; Take 1 capsule (100 mg total) by mouth once daily.  Dispense: 30 capsule; Refill: 1  -     varenicline (CHANTIX) 1 mg Tab; 30  Dispense: 30 tablet; Refill: 1

## 2020-08-31 RX ORDER — VARENICLINE TARTRATE 1 MG/1
TABLET, FILM COATED ORAL
Qty: 60 TABLET | Refills: 1 | Status: SHIPPED | OUTPATIENT
Start: 2020-08-31 | End: 2020-09-11

## 2020-08-31 NOTE — TELEPHONE ENCOUNTER
Pt was informed Chantix was sent to [facility] on [address]. Patient confirmed knowledge of request to pharmacy.

## 2020-09-04 ENCOUNTER — HOSPITAL ENCOUNTER (OUTPATIENT)
Dept: RADIOLOGY | Facility: HOSPITAL | Age: 40
Discharge: HOME | End: 2020-09-04
Attending: FAMILY MEDICINE

## 2020-09-04 DIAGNOSIS — R79.89 ABNORMAL LIVER FUNCTION TEST: ICD-10-CM

## 2020-09-04 PROCEDURE — 76705 ECHO EXAM OF ABDOMEN: CPT | Mod: TC

## 2020-09-11 ENCOUNTER — CLINICAL SUPPORT (OUTPATIENT)
Dept: FAMILY MEDICINE | Facility: CLINIC | Age: 40
End: 2020-09-11
Payer: COMMERCIAL

## 2020-09-11 VITALS — HEART RATE: 79 BPM | SYSTOLIC BLOOD PRESSURE: 130 MMHG | DIASTOLIC BLOOD PRESSURE: 80 MMHG

## 2020-09-11 PROCEDURE — 99999 PR PBB SHADOW E&M-EST. PATIENT-LVL II: CPT | Mod: PBBFAC,,,

## 2020-09-11 PROCEDURE — 99999 PR PBB SHADOW E&M-EST. PATIENT-LVL II: ICD-10-PCS | Mod: PBBFAC,,,

## 2020-09-11 RX ORDER — VARENICLINE TARTRATE 1 MG/1
TABLET, FILM COATED ORAL
Qty: 180 TABLET | Refills: 1 | Status: SHIPPED | OUTPATIENT
Start: 2020-09-11 | End: 2022-04-22

## 2020-09-11 NOTE — TELEPHONE ENCOUNTER
Patient here for BP check. Reading in left arm was 130/80 and pulse of 79. Pt reports taking Losartan 100 mg every night.   Dr. Arauz also reviewed test results with patient in room.

## 2020-10-14 ENCOUNTER — TELEPHONE (OUTPATIENT)
Dept: FAMILY MEDICINE | Facility: CLINIC | Age: 40
End: 2020-10-14

## 2020-10-14 NOTE — TELEPHONE ENCOUNTER
rx printed by Dr. adams this morning and I faxed to [facility]. Notified pt. Pt verbalized understanding.

## 2020-10-14 NOTE — TELEPHONE ENCOUNTER
----- Message from Alayna Anaya sent at 10/14/2020 11:23 AM CDT -----  Type:  RX Refill Request    Who Called:patient  Refill or New Rx:refill  RX Name and Strength:Losartan 100mg  How is the patient currently taking it? (ex. 1XDay):1xday  Is this a 30 day or 90 day RX:30day  Preferred Pharmacy with phone number: [facility]  Local or Mail Order:local  Ordering Provider:Dr Arauz  Would the patient rather a call back or a response via [facility]? Call back  Best Call Back Number:[phone]  Additional Information: na

## 2020-11-30 ENCOUNTER — PATIENT MESSAGE (OUTPATIENT)
Dept: FAMILY MEDICINE | Facility: CLINIC | Age: 40
End: 2020-11-30

## 2020-11-30 ENCOUNTER — OFFICE VISIT (OUTPATIENT)
Dept: FAMILY MEDICINE | Facility: CLINIC | Age: 40
End: 2020-11-30
Payer: COMMERCIAL

## 2020-11-30 DIAGNOSIS — R07.89 ATYPICAL CHEST PAIN: ICD-10-CM

## 2020-11-30 DIAGNOSIS — U07.1 COVID-19 VIRUS INFECTION: Primary | ICD-10-CM

## 2020-11-30 DIAGNOSIS — I10 ESSENTIAL HYPERTENSION: ICD-10-CM

## 2020-11-30 DIAGNOSIS — Z72.0 TOBACCO ABUSE: ICD-10-CM

## 2020-11-30 DIAGNOSIS — K75.81 STEATOHEPATITIS: ICD-10-CM

## 2020-11-30 PROCEDURE — 99214 OFFICE O/P EST MOD 30 MIN: CPT | Mod: 95,,, | Performed by: FAMILY MEDICINE

## 2020-11-30 PROCEDURE — 99214 PR OFFICE/OUTPT VISIT, EST, LEVL IV, 30-39 MIN: ICD-10-PCS | Mod: 95,,, | Performed by: FAMILY MEDICINE

## 2020-11-30 NOTE — PROGRESS NOTES
Subjective:       Patient ID: Billy Braga is a 40 y.o. male.    Chief Complaint: No chief complaint on file.      HPI Comments:       Current Outpatient Medications:     celecoxib (CELEBREX) 100 MG capsule, Take 1 capsule (100 mg total) by mouth once daily., Disp: 30 capsule, Rfl: 1    INV losartan (COZAAR) 100 MG Tab, Take 1 tablet (100 mg total) by mouth once daily., Disp: 90 tablet, Rfl: 1    varenicline (CHANTIX) 1 mg Tab, TAKE 1/2 TABLET BY MOUTH DAILY FOR 5 DAYS THEN 1/2 TABLET TWICE DAILY FOR 5 DAYS THEN 1 TABLET TWICE DAILY, Disp: 180 tablet, Rfl: 1      The patient location is:  Home   The chief complaint leading to consultation is:  Return to work after COVID    Visit type:  Tele audiovisual    Face to Face time with patient:  Approximately 15 min           Each patient to whom he or she provides medical services by telemedicine is:  (1) informed of the relationship between the physician and patient and the respective role of any other health care provider with respect to management of the patient; and (2) notified that he or she may decline to receive medical services by telemedicine and may withdraw from such care at any time.    Notes:     After a few days of symptoms, he was diagnosed with COVID on 10/17/2020.  Symptomatic for about a week.  Now asymptomatic for about 5 days.  Feels good.  Needs no to go back to work.  Will send us the written copy of the test results.    Was not able to afford the Chantix and I gave him last time.  Quit smoking any way on labor day.  Successful now for about 3 months.    Does not check his blood pressure outside of here.  Nurse visit check showed his blood pressure was better controlled on the increased dose of losartan.  He continues to take it every day.  Agrees to come back in 6 months.          Review of Systems   Constitutional: Negative for activity change and unexpected weight change.   HENT: Negative for hearing loss, rhinorrhea and trouble swallowing.     Eyes: Negative for discharge and visual disturbance.   Respiratory: Negative for chest tightness and wheezing.    Cardiovascular: Negative for chest pain and palpitations.   Gastrointestinal: Negative for blood in stool, constipation, diarrhea and vomiting.   Endocrine: Negative for polydipsia and polyuria.   Genitourinary: Negative for difficulty urinating, hematuria and urgency.   Musculoskeletal: Negative for arthralgias, joint swelling and neck pain.   Neurological: Negative for weakness and headaches.   Psychiatric/Behavioral: Negative for confusion and dysphoric mood.       Objective:      There were no vitals filed for this visit.  Physical Exam  Constitutional:       Appearance: He is not ill-appearing.   HENT:      Head: Normocephalic.   Neck:      Musculoskeletal: Neck supple.   Pulmonary:      Effort: Pulmonary effort is normal. No respiratory distress.   Neurological:      General: No focal deficit present.      Mental Status: He is alert and oriented to person, place, and time.   Psychiatric:         Mood and Affect: Mood normal.         Behavior: Behavior normal.         Thought Content: Thought content normal.         Judgment: Judgment normal.         Assessment:       1. COVID-19 virus infection    2. Tobacco abuse    3. Essential hypertension    4. Steatohepatitis    5. Atypical chest pain        Plan:   COVID-19 virus infection  Comments:  Completed quarantine.  Symptoms resolved.  Back to work note    Tobacco abuse  Comments:  Quit on his own.  Now for 3 months    Essential hypertension  Comments:  Appears to be controlled.  Follow-up 6 months    Steatohepatitis  Comments:  Stable enzymes    Atypical chest pain  Comments:  Resolved

## 2021-02-11 ENCOUNTER — TELEPHONE (OUTPATIENT)
Dept: FAMILY MEDICINE | Facility: CLINIC | Age: 41
End: 2021-02-11

## 2021-02-12 ENCOUNTER — OFFICE VISIT (OUTPATIENT)
Dept: FAMILY MEDICINE | Facility: CLINIC | Age: 41
End: 2021-02-12
Payer: COMMERCIAL

## 2021-02-12 VITALS
TEMPERATURE: 98 F | WEIGHT: 261.13 LBS | BODY MASS INDEX: 30.21 KG/M2 | DIASTOLIC BLOOD PRESSURE: 80 MMHG | OXYGEN SATURATION: 97 % | SYSTOLIC BLOOD PRESSURE: 130 MMHG | RESPIRATION RATE: 16 BRPM | HEIGHT: 78 IN | HEART RATE: 89 BPM

## 2021-02-12 DIAGNOSIS — K75.81 STEATOHEPATITIS: ICD-10-CM

## 2021-02-12 DIAGNOSIS — E78.5 HYPERLIPIDEMIA, UNSPECIFIED HYPERLIPIDEMIA TYPE: ICD-10-CM

## 2021-02-12 DIAGNOSIS — Z98.84 S/P BARIATRIC SURGERY: ICD-10-CM

## 2021-02-12 DIAGNOSIS — I10 ESSENTIAL HYPERTENSION: ICD-10-CM

## 2021-02-12 DIAGNOSIS — Z02.9 ADMINISTRATIVE ENCOUNTER: Primary | ICD-10-CM

## 2021-02-12 DIAGNOSIS — Z72.0 TOBACCO ABUSE: ICD-10-CM

## 2021-02-12 PROCEDURE — 3079F PR MOST RECENT DIASTOLIC BLOOD PRESSURE 80-89 MM HG: ICD-10-PCS | Mod: CPTII,S$GLB,, | Performed by: FAMILY MEDICINE

## 2021-02-12 PROCEDURE — 3008F BODY MASS INDEX DOCD: CPT | Mod: CPTII,S$GLB,, | Performed by: FAMILY MEDICINE

## 2021-02-12 PROCEDURE — 1126F PR PAIN SEVERITY QUANTIFIED, NO PAIN PRESENT: ICD-10-PCS | Mod: S$GLB,,, | Performed by: FAMILY MEDICINE

## 2021-02-12 PROCEDURE — 3075F PR MOST RECENT SYSTOLIC BLOOD PRESS GE 130-139MM HG: ICD-10-PCS | Mod: CPTII,S$GLB,, | Performed by: FAMILY MEDICINE

## 2021-02-12 PROCEDURE — 3008F PR BODY MASS INDEX (BMI) DOCUMENTED: ICD-10-PCS | Mod: CPTII,S$GLB,, | Performed by: FAMILY MEDICINE

## 2021-02-12 PROCEDURE — 99214 PR OFFICE/OUTPT VISIT, EST, LEVL IV, 30-39 MIN: ICD-10-PCS | Mod: S$GLB,,, | Performed by: FAMILY MEDICINE

## 2021-02-12 PROCEDURE — 99999 PR PBB SHADOW E&M-EST. PATIENT-LVL III: ICD-10-PCS | Mod: PBBFAC,,, | Performed by: FAMILY MEDICINE

## 2021-02-12 PROCEDURE — 99214 OFFICE O/P EST MOD 30 MIN: CPT | Mod: S$GLB,,, | Performed by: FAMILY MEDICINE

## 2021-02-12 PROCEDURE — 99999 PR PBB SHADOW E&M-EST. PATIENT-LVL III: CPT | Mod: PBBFAC,,, | Performed by: FAMILY MEDICINE

## 2021-02-12 PROCEDURE — 3079F DIAST BP 80-89 MM HG: CPT | Mod: CPTII,S$GLB,, | Performed by: FAMILY MEDICINE

## 2021-02-12 PROCEDURE — 3075F SYST BP GE 130 - 139MM HG: CPT | Mod: CPTII,S$GLB,, | Performed by: FAMILY MEDICINE

## 2021-02-12 PROCEDURE — 1126F AMNT PAIN NOTED NONE PRSNT: CPT | Mod: S$GLB,,, | Performed by: FAMILY MEDICINE

## 2022-04-22 ENCOUNTER — OFFICE VISIT (OUTPATIENT)
Dept: FAMILY MEDICINE | Facility: CLINIC | Age: 42
End: 2022-04-22
Payer: COMMERCIAL

## 2022-04-22 VITALS
HEART RATE: 80 BPM | BODY MASS INDEX: 22.59 KG/M2 | DIASTOLIC BLOOD PRESSURE: 50 MMHG | WEIGHT: 195.44 LBS | OXYGEN SATURATION: 99 % | TEMPERATURE: 98 F | SYSTOLIC BLOOD PRESSURE: 120 MMHG

## 2022-04-22 DIAGNOSIS — K75.81 STEATOHEPATITIS: ICD-10-CM

## 2022-04-22 DIAGNOSIS — E78.5 HYPERLIPIDEMIA, UNSPECIFIED HYPERLIPIDEMIA TYPE: ICD-10-CM

## 2022-04-22 DIAGNOSIS — I10 ESSENTIAL HYPERTENSION: ICD-10-CM

## 2022-04-22 DIAGNOSIS — Z72.0 TOBACCO ABUSE: ICD-10-CM

## 2022-04-22 DIAGNOSIS — Z98.84 S/P BARIATRIC SURGERY: ICD-10-CM

## 2022-04-22 DIAGNOSIS — I83.92 ASYMPTOMATIC VARICOSE VEINS OF LEFT LOWER EXTREMITY: ICD-10-CM

## 2022-04-22 DIAGNOSIS — Z00.00 ANNUAL PHYSICAL EXAM: Primary | ICD-10-CM

## 2022-04-22 PROCEDURE — 1159F MED LIST DOCD IN RCRD: CPT | Mod: CPTII,S$GLB,, | Performed by: FAMILY MEDICINE

## 2022-04-22 PROCEDURE — 3078F DIAST BP <80 MM HG: CPT | Mod: CPTII,S$GLB,, | Performed by: FAMILY MEDICINE

## 2022-04-22 PROCEDURE — 3008F PR BODY MASS INDEX (BMI) DOCUMENTED: ICD-10-PCS | Mod: CPTII,S$GLB,, | Performed by: FAMILY MEDICINE

## 2022-04-22 PROCEDURE — 3074F PR MOST RECENT SYSTOLIC BLOOD PRESSURE < 130 MM HG: ICD-10-PCS | Mod: CPTII,S$GLB,, | Performed by: FAMILY MEDICINE

## 2022-04-22 PROCEDURE — 3078F PR MOST RECENT DIASTOLIC BLOOD PRESSURE < 80 MM HG: ICD-10-PCS | Mod: CPTII,S$GLB,, | Performed by: FAMILY MEDICINE

## 2022-04-22 PROCEDURE — 99396 PREV VISIT EST AGE 40-64: CPT | Mod: S$GLB,,, | Performed by: FAMILY MEDICINE

## 2022-04-22 PROCEDURE — 3074F SYST BP LT 130 MM HG: CPT | Mod: CPTII,S$GLB,, | Performed by: FAMILY MEDICINE

## 2022-04-22 PROCEDURE — 3008F BODY MASS INDEX DOCD: CPT | Mod: CPTII,S$GLB,, | Performed by: FAMILY MEDICINE

## 2022-04-22 PROCEDURE — 1159F PR MEDICATION LIST DOCUMENTED IN MEDICAL RECORD: ICD-10-PCS | Mod: CPTII,S$GLB,, | Performed by: FAMILY MEDICINE

## 2022-04-22 PROCEDURE — 99396 PR PREVENTIVE VISIT,EST,40-64: ICD-10-PCS | Mod: S$GLB,,, | Performed by: FAMILY MEDICINE

## 2022-04-22 PROCEDURE — 99999 PR PBB SHADOW E&M-EST. PATIENT-LVL III: ICD-10-PCS | Mod: PBBFAC,,, | Performed by: FAMILY MEDICINE

## 2022-04-22 PROCEDURE — 99999 PR PBB SHADOW E&M-EST. PATIENT-LVL III: CPT | Mod: PBBFAC,,, | Performed by: FAMILY MEDICINE

## 2022-04-22 NOTE — PROGRESS NOTES
"Subjective:       Patient ID: Billy Braga is a 41 y.o. male.    Chief Complaint: No chief complaint on file.      HPI Comments:     No current outpatient medications on file.       here for annual physical.  He has continued to have significant post gastric sleeve weight loss since our visit last year.   Lost approximately 65 lb in the last 15 months.  Overall has lost about 115 lb    Quit smoking but now vaping.  Cautioned to cut back in to try to quit.    He stop his blood pressure medication because of blood pressure has been doing so well with the weight loss.  No longer taking Celebrex or  Chantix.      Sleeping well.  No problems with sex drive or energy.      Still working construction management.      Complains of his left foot looking "purple".  Concerned because his father had some significant vascular problems that required amputations.  No pain, aching or swelling noted    Review of Systems   Constitutional: Negative for activity change, appetite change and fever.   HENT: Negative for sore throat.    Respiratory: Negative for cough and shortness of breath.    Cardiovascular: Negative for chest pain.   Gastrointestinal: Negative for abdominal pain, diarrhea and nausea.   Genitourinary: Negative for difficulty urinating.   Musculoskeletal: Negative for arthralgias and myalgias.   Skin: Positive for color change.   Neurological: Negative for dizziness and headaches.       Objective:      Vitals:    04/22/22 1124   BP: (!) 120/50   Pulse: 80   Temp: 98.4 °F (36.9 °C)   SpO2: 99%   Weight: 88.6 kg (195 lb 7 oz)   PainSc: 0-No pain     Physical Exam  Vitals and nursing note reviewed.   Constitutional:       General: He is not in acute distress.     Appearance: He is well-developed. He is not diaphoretic.   HENT:      Head: Normocephalic.      Mouth/Throat:      Pharynx: No oropharyngeal exudate.   Neck:      Thyroid: No thyromegaly.   Cardiovascular:      Rate and Rhythm: Normal rate and regular rhythm.      " Pulses:           Dorsalis pedis pulses are 2+ on the right side and 2+ on the left side.        Posterior tibial pulses are 2+ on the right side and 2+ on the left side.      Heart sounds: Normal heart sounds. No murmur heard.     Comments: Varicose veins bilaterally in lower extremities.  Left much greater than right.  No swelling  Pulmonary:      Effort: Pulmonary effort is normal.      Breath sounds: Normal breath sounds. No wheezing or rales.   Abdominal:      General: There is no distension.      Palpations: Abdomen is soft. There is no hepatomegaly, splenomegaly or mass.      Tenderness: There is no abdominal tenderness.   Musculoskeletal:      Cervical back: Neck supple.   Lymphadenopathy:      Cervical: No cervical adenopathy.   Skin:     General: Skin is warm and dry.   Neurological:      Mental Status: He is alert and oriented to person, place, and time.   Psychiatric:         Behavior: Behavior normal.         Thought Content: Thought content normal.         Judgment: Judgment normal.         Assessment:       1. Annual physical exam    2. Essential hypertension    3. Tobacco abuse    4. Steatohepatitis    5. Hyperlipidemia, unspecified hyperlipidemia type    6. S/P bariatric surgery    7. Asymptomatic varicose veins of left lower extremity        Plan:   Annual physical exam  Comments:   cautioned about vaping    Essential hypertension  Comments:    significant weight loss.  Now  appears to be normotensive.  Okay to stop  BP medicine    Tobacco abuse  Comments:  now vaping nicotine. Cessation advised    Steatohepatitis  Comments:   significant weight loss.  Check  transaminase  Orders:  -     Comprehensive Metabolic Panel; Future; Expected date: 04/22/2022    Hyperlipidemia, unspecified hyperlipidemia type  Comments:   fasting lipid profile.  Currently not on medication  Orders:  -     Lipid Panel; Future; Expected date: 04/22/2022    S/P bariatric surgery  Comments:   has lost over 100 lb since his  surgery    Asymptomatic varicose veins of left lower extremity  Comments:   will monitor for symptoms

## 2022-08-10 ENCOUNTER — TELEPHONE (OUTPATIENT)
Dept: FAMILY MEDICINE | Facility: CLINIC | Age: 42
End: 2022-08-10
Payer: COMMERCIAL

## 2022-08-10 DIAGNOSIS — R41.840 CONCENTRATION DEFICIT: Primary | ICD-10-CM

## 2022-08-10 NOTE — TELEPHONE ENCOUNTER
Spoke to patient about what the patient was looking for in terms of the appointment. Patient stated that he feels like he might have ADHD or ADD and would like to get diagnosed. Patient also stated that he will do blood work tomorrow if needed.

## 2022-08-11 NOTE — TELEPHONE ENCOUNTER
Patient stated he might come in today for blood work or maybe another day. I expressed to him the importance of the blood work needing to be collected. Patient voiced understanding.

## 2022-08-11 NOTE — TELEPHONE ENCOUNTER
Called patient and infomed him about referral.  Called [facility] but they do not accept his insurance. Patient voiced understanding.   Patient stated that he will call his insurance company and see who can see him with the insurance that he has and he will let us know.

## 2022-09-06 ENCOUNTER — OFFICE VISIT (OUTPATIENT)
Dept: FAMILY MEDICINE | Facility: CLINIC | Age: 42
End: 2022-09-06
Payer: COMMERCIAL

## 2022-09-06 VITALS
OXYGEN SATURATION: 100 % | SYSTOLIC BLOOD PRESSURE: 120 MMHG | HEART RATE: 77 BPM | BODY MASS INDEX: 22.64 KG/M2 | HEIGHT: 78 IN | TEMPERATURE: 97 F | DIASTOLIC BLOOD PRESSURE: 80 MMHG | WEIGHT: 195.69 LBS

## 2022-09-06 DIAGNOSIS — R41.840 POOR CONCENTRATION: Primary | ICD-10-CM

## 2022-09-06 PROCEDURE — 3079F DIAST BP 80-89 MM HG: CPT | Mod: CPTII,S$GLB,, | Performed by: FAMILY MEDICINE

## 2022-09-06 PROCEDURE — 99214 OFFICE O/P EST MOD 30 MIN: CPT | Mod: S$GLB,,, | Performed by: FAMILY MEDICINE

## 2022-09-06 PROCEDURE — 3074F PR MOST RECENT SYSTOLIC BLOOD PRESSURE < 130 MM HG: ICD-10-PCS | Mod: CPTII,S$GLB,, | Performed by: FAMILY MEDICINE

## 2022-09-06 PROCEDURE — 3074F SYST BP LT 130 MM HG: CPT | Mod: CPTII,S$GLB,, | Performed by: FAMILY MEDICINE

## 2022-09-06 PROCEDURE — 99214 PR OFFICE/OUTPT VISIT, EST, LEVL IV, 30-39 MIN: ICD-10-PCS | Mod: S$GLB,,, | Performed by: FAMILY MEDICINE

## 2022-09-06 PROCEDURE — 3008F PR BODY MASS INDEX (BMI) DOCUMENTED: ICD-10-PCS | Mod: CPTII,S$GLB,, | Performed by: FAMILY MEDICINE

## 2022-09-06 PROCEDURE — 3079F PR MOST RECENT DIASTOLIC BLOOD PRESSURE 80-89 MM HG: ICD-10-PCS | Mod: CPTII,S$GLB,, | Performed by: FAMILY MEDICINE

## 2022-09-06 PROCEDURE — 1159F PR MEDICATION LIST DOCUMENTED IN MEDICAL RECORD: ICD-10-PCS | Mod: CPTII,S$GLB,, | Performed by: FAMILY MEDICINE

## 2022-09-06 PROCEDURE — 99999 PR PBB SHADOW E&M-EST. PATIENT-LVL III: ICD-10-PCS | Mod: PBBFAC,,, | Performed by: FAMILY MEDICINE

## 2022-09-06 PROCEDURE — 1159F MED LIST DOCD IN RCRD: CPT | Mod: CPTII,S$GLB,, | Performed by: FAMILY MEDICINE

## 2022-09-06 PROCEDURE — 3008F BODY MASS INDEX DOCD: CPT | Mod: CPTII,S$GLB,, | Performed by: FAMILY MEDICINE

## 2022-09-06 PROCEDURE — 99999 PR PBB SHADOW E&M-EST. PATIENT-LVL III: CPT | Mod: PBBFAC,,, | Performed by: FAMILY MEDICINE

## 2022-09-06 NOTE — PROGRESS NOTES
Chief Complaint:    Chief Complaint   Patient presents with    ADD       History of Present Illness:  Patient with HTN presents today for a follow-up    He says he has trouble focusing and is forgetful. Wants to get tested for ADD. His son is on medication, he wants to try something as well. Recently got a promotion at work. He can't complete tasks. Yesterday, he was looking for a pencil. When he found it, he couldn't remember why he needed it. Denies depression or anxiety. He didn't do well in school but nothing was done about it. Reports a good sleep regimen.       ROS:  Review of Systems   Constitutional:  Negative for appetite change, chills and fever.   HENT:  Negative for congestion, ear pain, postnasal drip, rhinorrhea, sinus pressure and sinus pain.    Eyes:  Negative for pain.   Respiratory:  Negative for cough, chest tightness and shortness of breath.    Cardiovascular:  Negative for chest pain and palpitations.   Gastrointestinal:  Negative for abdominal pain, blood in stool, constipation, diarrhea and nausea.   Genitourinary:  Negative for difficulty urinating, dysuria, flank pain and hematuria.   Musculoskeletal:  Negative for arthralgias and myalgias.   Skin:  Negative for pallor and wound.   Neurological:  Negative for dizziness, tremors, speech difficulty, light-headedness and headaches.   Psychiatric/Behavioral:  Positive for decreased concentration. Negative for behavioral problems, dysphoric mood and sleep disturbance. The patient is not nervous/anxious.    All other systems reviewed and are negative.    No past medical history on file.    Social History:  Social History     Socioeconomic History    Marital status:    Tobacco Use    Smoking status: Former     Packs/day: 1.00     Types: Cigarettes     Quit date: 2020     Years since quittin.9   Substance and Sexual Activity    Alcohol use: Yes    Drug use: No       Family History:   family history is not on file.    Health  "Maintenance   Topic Date Due    Lipid Panel  10/25/2024    TETANUS VACCINE  10/25/2029    Hepatitis C Screening  Completed       Physical Exam:    Vital Signs  Temp: 97.2 °F (36.2 °C)  Temp src: Tympanic  Pulse: 77  SpO2: 100 %  BP: 120/80  BP Location: Left arm  Patient Position: Sitting  Pain Score: 0-No pain  Height and Weight  Height: 6' 6" (198.1 cm)  Weight: 88.7 kg (195 lb 10.5 oz)  BSA (Calculated - sq m): 2.21 sq meters  BMI (Calculated): 22.6  Weight in (lb) to have BMI = 25: 215.9]    Body mass index is 22.61 kg/m².    Physical Exam  Vitals and nursing note reviewed.   Constitutional:       Appearance: Normal appearance.   HENT:      Head: Normocephalic and atraumatic.      Right Ear: Tympanic membrane normal.      Left Ear: Tympanic membrane normal.   Eyes:      Extraocular Movements: Extraocular movements intact.      Pupils: Pupils are equal, round, and reactive to light.   Cardiovascular:      Rate and Rhythm: Normal rate and regular rhythm.      Pulses: Normal pulses.      Heart sounds: Normal heart sounds. No murmur heard.    No gallop.   Pulmonary:      Effort: Pulmonary effort is normal. No respiratory distress.      Breath sounds: Normal breath sounds. No wheezing, rhonchi or rales.   Abdominal:      General: There is no distension.      Palpations: Abdomen is soft.      Tenderness: There is no abdominal tenderness.   Musculoskeletal:         General: No swelling, deformity or signs of injury. Normal range of motion.      Cervical back: Normal range of motion.   Skin:     General: Skin is warm and dry.      Capillary Refill: Capillary refill takes less than 2 seconds.      Coloration: Skin is not jaundiced or pale.   Neurological:      General: No focal deficit present.      Mental Status: He is alert and oriented to person, place, and time.   Psychiatric:         Mood and Affect: Mood normal.         Behavior: Behavior normal.         Assessment:      ICD-10-CM ICD-9-CM   1. Poor concentration  " R41.840 799.51       Plan:     Get psychological evaluation for ADHD.  No orders of the defined types were placed in this encounter.    No current outpatient medications on file.     No current facility-administered medications for this visit.       There are no discontinued medications.    No follow-ups on file.      Jori Marx MD  Scribe Attestation:   I, Justine Pete, am scribing for, and in the presence of, Dr.Arif Marx I performed the above scribed service and the documentation accurately describes the services I performed. I attest to the accuracy of the note.    I, Dr. Jori Marx, reviewed documentation as scribed above. I performed the services described in this documentation.  I agree that the record reflects my personal performance and is accurate and complete. Jori Marx MD.  09/06/2022

## 2022-09-15 ENCOUNTER — TELEPHONE (OUTPATIENT)
Dept: FAMILY MEDICINE | Facility: CLINIC | Age: 42
End: 2022-09-15
Payer: COMMERCIAL

## 2022-09-15 DIAGNOSIS — R41.840 POOR CONCENTRATION: Primary | ICD-10-CM

## 2022-09-15 DIAGNOSIS — R41.840 CONCENTRATION DEFICIT: ICD-10-CM

## 2022-09-15 NOTE — TELEPHONE ENCOUNTER
----- Message from Sandra Contreras sent at 9/15/2022  2:28 PM CDT -----  Contact: Billy  Patient would like to have a referral and the notes of his last appointment faxed over to a different. Please give him a call at [phone] in any questions    Referral : Dept Clinical psychologist / Dr Nan Woodson / [facility]  Location : [address] [place] [address] [address] [place] [address]  Phone number: [phone]      Thanks

## 2022-09-20 ENCOUNTER — PATIENT MESSAGE (OUTPATIENT)
Dept: FAMILY MEDICINE | Facility: CLINIC | Age: 42
End: 2022-09-20
Payer: COMMERCIAL

## 2022-11-25 ENCOUNTER — PATIENT MESSAGE (OUTPATIENT)
Dept: PSYCHIATRY | Facility: CLINIC | Age: 42
End: 2022-11-25
Payer: COMMERCIAL

## 2023-01-13 ENCOUNTER — OFFICE VISIT (OUTPATIENT)
Dept: FAMILY MEDICINE | Facility: CLINIC | Age: 43
End: 2023-01-13
Payer: COMMERCIAL

## 2023-01-13 ENCOUNTER — LAB VISIT (OUTPATIENT)
Dept: LAB | Facility: HOSPITAL | Age: 43
End: 2023-01-13
Attending: FAMILY MEDICINE
Payer: COMMERCIAL

## 2023-01-13 VITALS
DIASTOLIC BLOOD PRESSURE: 82 MMHG | SYSTOLIC BLOOD PRESSURE: 130 MMHG | HEIGHT: 78 IN | OXYGEN SATURATION: 96 % | HEART RATE: 92 BPM | TEMPERATURE: 98 F | BODY MASS INDEX: 23.06 KG/M2 | WEIGHT: 199.31 LBS

## 2023-01-13 DIAGNOSIS — Z98.84 S/P BARIATRIC SURGERY: ICD-10-CM

## 2023-01-13 DIAGNOSIS — Z00.00 ANNUAL PHYSICAL EXAM: ICD-10-CM

## 2023-01-13 DIAGNOSIS — Z00.00 ANNUAL PHYSICAL EXAM: Primary | ICD-10-CM

## 2023-01-13 DIAGNOSIS — K75.81 STEATOHEPATITIS: ICD-10-CM

## 2023-01-13 DIAGNOSIS — I10 ESSENTIAL HYPERTENSION: ICD-10-CM

## 2023-01-13 DIAGNOSIS — Z72.0 NICOTINE ABUSE: ICD-10-CM

## 2023-01-13 LAB
ALBUMIN SERPL BCP-MCNC: 4.1 G/DL (ref 3.5–5.2)
ALP SERPL-CCNC: 59 U/L (ref 55–135)
ALT SERPL W/O P-5'-P-CCNC: 17 U/L (ref 10–44)
ANION GAP SERPL CALC-SCNC: 8 MMOL/L (ref 8–16)
AST SERPL-CCNC: 19 U/L (ref 10–40)
BASOPHILS # BLD AUTO: 0.04 K/UL (ref 0–0.2)
BASOPHILS NFR BLD: 0.8 % (ref 0–1.9)
BILIRUB SERPL-MCNC: 3.3 MG/DL (ref 0.1–1)
BUN SERPL-MCNC: 12 MG/DL (ref 6–20)
CALCIUM SERPL-MCNC: 9 MG/DL (ref 8.7–10.5)
CHLORIDE SERPL-SCNC: 108 MMOL/L (ref 95–110)
CHOLEST SERPL-MCNC: 158 MG/DL (ref 120–199)
CHOLEST/HDLC SERPL: 2.8 {RATIO} (ref 2–5)
CO2 SERPL-SCNC: 26 MMOL/L (ref 23–29)
CREAT SERPL-MCNC: 0.8 MG/DL (ref 0.5–1.4)
DIFFERENTIAL METHOD: ABNORMAL
EOSINOPHIL # BLD AUTO: 0.1 K/UL (ref 0–0.5)
EOSINOPHIL NFR BLD: 2 % (ref 0–8)
ERYTHROCYTE [DISTWIDTH] IN BLOOD BY AUTOMATED COUNT: 12.4 % (ref 11.5–14.5)
EST. GFR  (NO RACE VARIABLE): >60 ML/MIN/1.73 M^2
GLUCOSE SERPL-MCNC: 79 MG/DL (ref 70–110)
HCT VFR BLD AUTO: 44.3 % (ref 40–54)
HDLC SERPL-MCNC: 57 MG/DL (ref 40–75)
HDLC SERPL: 36.1 % (ref 20–50)
HGB BLD-MCNC: 14.5 G/DL (ref 14–18)
IMM GRANULOCYTES # BLD AUTO: 0.01 K/UL (ref 0–0.04)
IMM GRANULOCYTES NFR BLD AUTO: 0.2 % (ref 0–0.5)
LDLC SERPL CALC-MCNC: 90.4 MG/DL (ref 63–159)
LYMPHOCYTES # BLD AUTO: 1.5 K/UL (ref 1–4.8)
LYMPHOCYTES NFR BLD: 29.2 % (ref 18–48)
MCH RBC QN AUTO: 30.1 PG (ref 27–31)
MCHC RBC AUTO-ENTMCNC: 32.7 G/DL (ref 32–36)
MCV RBC AUTO: 92 FL (ref 82–98)
MONOCYTES # BLD AUTO: 0.2 K/UL (ref 0.3–1)
MONOCYTES NFR BLD: 4.7 % (ref 4–15)
NEUTROPHILS # BLD AUTO: 3.2 K/UL (ref 1.8–7.7)
NEUTROPHILS NFR BLD: 63.1 % (ref 38–73)
NONHDLC SERPL-MCNC: 101 MG/DL
NRBC BLD-RTO: 0 /100 WBC
PLATELET # BLD AUTO: 229 K/UL (ref 150–450)
PMV BLD AUTO: 11.3 FL (ref 9.2–12.9)
POTASSIUM SERPL-SCNC: 4.4 MMOL/L (ref 3.5–5.1)
PROT SERPL-MCNC: 6.8 G/DL (ref 6–8.4)
RBC # BLD AUTO: 4.81 M/UL (ref 4.6–6.2)
SODIUM SERPL-SCNC: 142 MMOL/L (ref 136–145)
TRIGL SERPL-MCNC: 53 MG/DL (ref 30–150)
WBC # BLD AUTO: 5.1 K/UL (ref 3.9–12.7)

## 2023-01-13 PROCEDURE — 99396 PR PREVENTIVE VISIT,EST,40-64: ICD-10-PCS | Mod: S$GLB,,, | Performed by: FAMILY MEDICINE

## 2023-01-13 PROCEDURE — 1159F PR MEDICATION LIST DOCUMENTED IN MEDICAL RECORD: ICD-10-PCS | Mod: CPTII,S$GLB,, | Performed by: FAMILY MEDICINE

## 2023-01-13 PROCEDURE — 3079F PR MOST RECENT DIASTOLIC BLOOD PRESSURE 80-89 MM HG: ICD-10-PCS | Mod: CPTII,S$GLB,, | Performed by: FAMILY MEDICINE

## 2023-01-13 PROCEDURE — 80061 LIPID PANEL: CPT | Performed by: FAMILY MEDICINE

## 2023-01-13 PROCEDURE — 3008F PR BODY MASS INDEX (BMI) DOCUMENTED: ICD-10-PCS | Mod: CPTII,S$GLB,, | Performed by: FAMILY MEDICINE

## 2023-01-13 PROCEDURE — 1159F MED LIST DOCD IN RCRD: CPT | Mod: CPTII,S$GLB,, | Performed by: FAMILY MEDICINE

## 2023-01-13 PROCEDURE — 85025 COMPLETE CBC W/AUTO DIFF WBC: CPT | Performed by: FAMILY MEDICINE

## 2023-01-13 PROCEDURE — 3075F SYST BP GE 130 - 139MM HG: CPT | Mod: CPTII,S$GLB,, | Performed by: FAMILY MEDICINE

## 2023-01-13 PROCEDURE — 99999 PR PBB SHADOW E&M-EST. PATIENT-LVL III: CPT | Mod: PBBFAC,,, | Performed by: FAMILY MEDICINE

## 2023-01-13 PROCEDURE — 3075F PR MOST RECENT SYSTOLIC BLOOD PRESS GE 130-139MM HG: ICD-10-PCS | Mod: CPTII,S$GLB,, | Performed by: FAMILY MEDICINE

## 2023-01-13 PROCEDURE — 36415 COLL VENOUS BLD VENIPUNCTURE: CPT | Mod: PO | Performed by: FAMILY MEDICINE

## 2023-01-13 PROCEDURE — 3008F BODY MASS INDEX DOCD: CPT | Mod: CPTII,S$GLB,, | Performed by: FAMILY MEDICINE

## 2023-01-13 PROCEDURE — 80053 COMPREHEN METABOLIC PANEL: CPT | Performed by: FAMILY MEDICINE

## 2023-01-13 PROCEDURE — 99396 PREV VISIT EST AGE 40-64: CPT | Mod: S$GLB,,, | Performed by: FAMILY MEDICINE

## 2023-01-13 PROCEDURE — 99999 PR PBB SHADOW E&M-EST. PATIENT-LVL III: ICD-10-PCS | Mod: PBBFAC,,, | Performed by: FAMILY MEDICINE

## 2023-01-13 PROCEDURE — 3079F DIAST BP 80-89 MM HG: CPT | Mod: CPTII,S$GLB,, | Performed by: FAMILY MEDICINE

## 2023-01-13 NOTE — PROGRESS NOTES
"Subjective:       Patient ID: Billy Braga is a 42 y.o. male.    Chief Complaint: Annual Exam      HPI Comments:     No current outpatient medications on file.      Here for annual physical.  Has been able to maintain his bariatric weight loss.  Also filled out paperwork for foster parenting    No new concerns or complaints today.    Blood pressure remains normal off medication since weight loss.      Still vaping.  No plans to quit    Review of Systems   Constitutional:  Negative for activity change, appetite change and fever.   HENT:  Negative for sore throat.    Respiratory:  Negative for cough and shortness of breath.    Cardiovascular:  Negative for chest pain.   Gastrointestinal:  Negative for abdominal pain, diarrhea and nausea.   Genitourinary:  Negative for difficulty urinating.   Musculoskeletal:  Negative for arthralgias and myalgias.   Neurological:  Negative for dizziness and headaches.     Objective:      Vitals:    01/13/23 0847   BP: 130/82   Pulse: 92   Temp: 97.5 °F (36.4 °C)   SpO2: 96%   Weight: 90.4 kg (199 lb 4.7 oz)   Height: 6' 6" (1.981 m)   PainSc: 0-No pain     Physical Exam  Vitals and nursing note reviewed.   Constitutional:       General: He is not in acute distress.     Appearance: He is well-developed. He is not diaphoretic.   HENT:      Head: Normocephalic.      Mouth/Throat:      Pharynx: No oropharyngeal exudate.   Neck:      Thyroid: No thyromegaly.   Cardiovascular:      Rate and Rhythm: Normal rate and regular rhythm.      Heart sounds: Normal heart sounds. No murmur heard.  Pulmonary:      Effort: Pulmonary effort is normal.      Breath sounds: Normal breath sounds. No wheezing or rales.   Abdominal:      General: There is no distension.      Palpations: Abdomen is soft. There is no hepatomegaly, splenomegaly or mass.      Tenderness: There is no abdominal tenderness.   Musculoskeletal:      Cervical back: Neck supple.      Right lower leg: No edema.      Left lower leg: No " edema.   Lymphadenopathy:      Cervical: No cervical adenopathy.   Skin:     General: Skin is warm and dry.   Neurological:      Mental Status: He is alert and oriented to person, place, and time.   Psychiatric:         Mood and Affect: Mood normal.         Behavior: Behavior normal.         Thought Content: Thought content normal.         Judgment: Judgment normal.       Assessment:       1. Annual physical exam    2. Essential hypertension    3. S/P bariatric surgery    4. Steatohepatitis    5. Nicotine abuse          Plan:   Annual physical exam  Comments:  Lipid profile  Orders:  -     Lipid Panel; Future; Expected date: 01/13/2023    Essential hypertension  Comments:  Normal without medication  Orders:  -     CBC Auto Differential; Future; Expected date: 01/13/2023    S/P bariatric surgery  Comments:  Has leveled off after losing 100 lb post gastric sleeve    Steatohepatitis  Comments:  CMP today  Orders:  -     Comprehensive Metabolic Panel; Future; Expected date: 01/13/2023    Nicotine abuse  Comments:  vaping

## 2023-12-06 ENCOUNTER — LAB VISIT (OUTPATIENT)
Dept: LAB | Facility: HOSPITAL | Age: 43
End: 2023-12-06
Attending: FAMILY MEDICINE
Payer: COMMERCIAL

## 2023-12-06 ENCOUNTER — OFFICE VISIT (OUTPATIENT)
Dept: FAMILY MEDICINE | Facility: CLINIC | Age: 43
End: 2023-12-06
Payer: COMMERCIAL

## 2023-12-06 VITALS
BODY MASS INDEX: 23.97 KG/M2 | TEMPERATURE: 100 F | OXYGEN SATURATION: 98 % | WEIGHT: 207.44 LBS | SYSTOLIC BLOOD PRESSURE: 135 MMHG | DIASTOLIC BLOOD PRESSURE: 90 MMHG | HEART RATE: 94 BPM

## 2023-12-06 DIAGNOSIS — Z00.00 ANNUAL PHYSICAL EXAM: ICD-10-CM

## 2023-12-06 DIAGNOSIS — K75.81 STEATOHEPATITIS: ICD-10-CM

## 2023-12-06 DIAGNOSIS — Z00.00 ANNUAL PHYSICAL EXAM: Primary | ICD-10-CM

## 2023-12-06 DIAGNOSIS — I10 ESSENTIAL HYPERTENSION: ICD-10-CM

## 2023-12-06 DIAGNOSIS — Z98.84 S/P BARIATRIC SURGERY: ICD-10-CM

## 2023-12-06 DIAGNOSIS — R53.83 FATIGUE, UNSPECIFIED TYPE: ICD-10-CM

## 2023-12-06 DIAGNOSIS — N48.9 PENIS DISORDER: ICD-10-CM

## 2023-12-06 LAB
ALBUMIN SERPL BCP-MCNC: 4.3 G/DL (ref 3.5–5.2)
ALP SERPL-CCNC: 64 U/L (ref 55–135)
ALT SERPL W/O P-5'-P-CCNC: 16 U/L (ref 10–44)
ANION GAP SERPL CALC-SCNC: 8 MMOL/L (ref 8–16)
AST SERPL-CCNC: 18 U/L (ref 10–40)
BASOPHILS # BLD AUTO: 0.04 K/UL (ref 0–0.2)
BASOPHILS NFR BLD: 0.6 % (ref 0–1.9)
BILIRUB SERPL-MCNC: 2.1 MG/DL (ref 0.1–1)
BUN SERPL-MCNC: 14 MG/DL (ref 6–20)
CALCIUM SERPL-MCNC: 9.6 MG/DL (ref 8.7–10.5)
CHLORIDE SERPL-SCNC: 108 MMOL/L (ref 95–110)
CO2 SERPL-SCNC: 27 MMOL/L (ref 23–29)
CREAT SERPL-MCNC: 0.9 MG/DL (ref 0.5–1.4)
DIFFERENTIAL METHOD: ABNORMAL
EOSINOPHIL # BLD AUTO: 0.1 K/UL (ref 0–0.5)
EOSINOPHIL NFR BLD: 1.7 % (ref 0–8)
ERYTHROCYTE [DISTWIDTH] IN BLOOD BY AUTOMATED COUNT: 12.4 % (ref 11.5–14.5)
EST. GFR  (NO RACE VARIABLE): >60 ML/MIN/1.73 M^2
GLUCOSE SERPL-MCNC: 80 MG/DL (ref 70–110)
HCT VFR BLD AUTO: 43.8 % (ref 40–54)
HGB BLD-MCNC: 14.8 G/DL (ref 14–18)
IMM GRANULOCYTES # BLD AUTO: 0.01 K/UL (ref 0–0.04)
IMM GRANULOCYTES NFR BLD AUTO: 0.2 % (ref 0–0.5)
LYMPHOCYTES # BLD AUTO: 1.6 K/UL (ref 1–4.8)
LYMPHOCYTES NFR BLD: 25.6 % (ref 18–48)
MCH RBC QN AUTO: 31.4 PG (ref 27–31)
MCHC RBC AUTO-ENTMCNC: 33.8 G/DL (ref 32–36)
MCV RBC AUTO: 93 FL (ref 82–98)
MONOCYTES # BLD AUTO: 0.4 K/UL (ref 0.3–1)
MONOCYTES NFR BLD: 5.7 % (ref 4–15)
NEUTROPHILS # BLD AUTO: 4.2 K/UL (ref 1.8–7.7)
NEUTROPHILS NFR BLD: 66.2 % (ref 38–73)
NRBC BLD-RTO: 0 /100 WBC
PLATELET # BLD AUTO: 213 K/UL (ref 150–450)
PMV BLD AUTO: 11.3 FL (ref 9.2–12.9)
POTASSIUM SERPL-SCNC: 4.7 MMOL/L (ref 3.5–5.1)
PROT SERPL-MCNC: 7.4 G/DL (ref 6–8.4)
RBC # BLD AUTO: 4.72 M/UL (ref 4.6–6.2)
SODIUM SERPL-SCNC: 143 MMOL/L (ref 136–145)
TESTOST SERPL-MCNC: 318 NG/DL (ref 304–1227)
TSH SERPL DL<=0.005 MIU/L-ACNC: 0.99 UIU/ML (ref 0.4–4)
WBC # BLD AUTO: 6.36 K/UL (ref 3.9–12.7)

## 2023-12-06 PROCEDURE — 99214 PR OFFICE/OUTPT VISIT, EST, LEVL IV, 30-39 MIN: ICD-10-PCS | Mod: S$GLB,,, | Performed by: FAMILY MEDICINE

## 2023-12-06 PROCEDURE — 99999 PR PBB SHADOW E&M-EST. PATIENT-LVL III: ICD-10-PCS | Mod: PBBFAC,,, | Performed by: FAMILY MEDICINE

## 2023-12-06 PROCEDURE — 3008F BODY MASS INDEX DOCD: CPT | Mod: CPTII,S$GLB,, | Performed by: FAMILY MEDICINE

## 2023-12-06 PROCEDURE — 3080F DIAST BP >= 90 MM HG: CPT | Mod: CPTII,S$GLB,, | Performed by: FAMILY MEDICINE

## 2023-12-06 PROCEDURE — 1159F PR MEDICATION LIST DOCUMENTED IN MEDICAL RECORD: ICD-10-PCS | Mod: CPTII,S$GLB,, | Performed by: FAMILY MEDICINE

## 2023-12-06 PROCEDURE — 80053 COMPREHEN METABOLIC PANEL: CPT | Performed by: FAMILY MEDICINE

## 2023-12-06 PROCEDURE — 3075F SYST BP GE 130 - 139MM HG: CPT | Mod: CPTII,S$GLB,, | Performed by: FAMILY MEDICINE

## 2023-12-06 PROCEDURE — 84443 ASSAY THYROID STIM HORMONE: CPT | Performed by: FAMILY MEDICINE

## 2023-12-06 PROCEDURE — 3008F PR BODY MASS INDEX (BMI) DOCUMENTED: ICD-10-PCS | Mod: CPTII,S$GLB,, | Performed by: FAMILY MEDICINE

## 2023-12-06 PROCEDURE — 1159F MED LIST DOCD IN RCRD: CPT | Mod: CPTII,S$GLB,, | Performed by: FAMILY MEDICINE

## 2023-12-06 PROCEDURE — 84403 ASSAY OF TOTAL TESTOSTERONE: CPT | Performed by: FAMILY MEDICINE

## 2023-12-06 PROCEDURE — 36415 COLL VENOUS BLD VENIPUNCTURE: CPT | Mod: PO | Performed by: FAMILY MEDICINE

## 2023-12-06 PROCEDURE — 3080F PR MOST RECENT DIASTOLIC BLOOD PRESSURE >= 90 MM HG: ICD-10-PCS | Mod: CPTII,S$GLB,, | Performed by: FAMILY MEDICINE

## 2023-12-06 PROCEDURE — 3075F PR MOST RECENT SYSTOLIC BLOOD PRESS GE 130-139MM HG: ICD-10-PCS | Mod: CPTII,S$GLB,, | Performed by: FAMILY MEDICINE

## 2023-12-06 PROCEDURE — 99214 OFFICE O/P EST MOD 30 MIN: CPT | Mod: S$GLB,,, | Performed by: FAMILY MEDICINE

## 2023-12-06 PROCEDURE — 99999 PR PBB SHADOW E&M-EST. PATIENT-LVL III: CPT | Mod: PBBFAC,,, | Performed by: FAMILY MEDICINE

## 2023-12-06 PROCEDURE — 85025 COMPLETE CBC W/AUTO DIFF WBC: CPT | Performed by: FAMILY MEDICINE

## 2023-12-06 NOTE — PROGRESS NOTES
Subjective:       Patient ID: Billy Braga is a 43 y.o. male.    Chief Complaint: Annual Exam      HPI Comments:     No current outpatient medications on file.      Here for annual physical.    Complains of 6 months of feeling lazy and tired.  Does not feel like doing anything.  Going to the gym did not help.  Denies mood abnormality.  Very happy with their foster child.  Worried about testosterone.    Confirms low libido.  Also has some kind of incomplete erection or half the penis becomes engorged but he other half does not appear to.  Very sharply demarcated.  The also was a lump near the base of the penis recently that seems to have disappeared.    Blood pressure at home has been good.  He has been off blood pressure medication for some time.  Today's reading was elevated.  He will come back in a month with his blood pressure cuff to compare [id number].      Review of Systems   Constitutional:  Positive for fatigue. Negative for activity change, appetite change and fever.   HENT:  Negative for sore throat.    Respiratory:  Negative for cough and shortness of breath.    Cardiovascular:  Negative for chest pain.   Gastrointestinal:  Negative for abdominal pain, diarrhea and nausea.   Genitourinary:  Negative for difficulty urinating.   Musculoskeletal:  Negative for arthralgias and myalgias.   Neurological:  Negative for dizziness and headaches.   Psychiatric/Behavioral:  Negative for dysphoric mood.        Objective:      Vitals:    12/06/23 1601   BP: (!) 135/90   Pulse: 94   Temp: 99.8 °F (37.7 °C)   TempSrc: Tympanic   SpO2: 98%   Weight: 94.1 kg (207 lb 7.3 oz)   PainSc: 0-No pain     Physical Exam  Vitals and nursing note reviewed.   Constitutional:       General: He is not in acute distress.     Appearance: He is well-developed. He is not diaphoretic.   HENT:      Head: Normocephalic.   Neck:      Thyroid: No thyromegaly.   Cardiovascular:      Rate and Rhythm: Normal rate and regular rhythm.      Heart  sounds: Normal heart sounds. No murmur heard.  Pulmonary:      Effort: Pulmonary effort is normal.      Breath sounds: Normal breath sounds. No wheezing or rales.   Abdominal:      General: There is no distension.      Palpations: Abdomen is soft.   Musculoskeletal:      Cervical back: Neck supple.   Lymphadenopathy:      Cervical: No cervical adenopathy.   Skin:     General: Skin is warm and dry.   Neurological:      Mental Status: He is alert and oriented to person, place, and time.   Psychiatric:         Mood and Affect: Mood normal.         Behavior: Behavior normal.         Thought Content: Thought content normal.         Judgment: Judgment normal.         Assessment:       1. Annual physical exam    2. Essential hypertension    3. S/P bariatric surgery    4. Steatohepatitis    5. Penis disorder    6. Fatigue, unspecified type        Plan:   Annual physical exam  -     Comprehensive Metabolic Panel; Future; Expected date: 12/06/2023  -     CBC Auto Differential; Future; Expected date: 12/06/2023    Essential hypertension  Comments:  Elevated today off medication.  Follow-up one-month with blood pressure cuff from home.    S/P bariatric surgery  Comments:  Weight unchanged since last visit    Steatohepatitis  Comments:  CMP    Penis disorder  Comments:  urology consult  Orders:  -     Ambulatory referral/consult to Urology; Future; Expected date: 12/13/2023    Fatigue, unspecified type  Comments:  Testosterone level  Orders:  -     TSH; Future; Expected date: 12/06/2023  -     Testosterone; Future; Expected date: 12/06/2023

## 2024-01-02 ENCOUNTER — OFFICE VISIT (OUTPATIENT)
Dept: UROLOGY | Facility: CLINIC | Age: 44
End: 2024-01-02
Payer: COMMERCIAL

## 2024-01-02 VITALS
HEART RATE: 68 BPM | HEIGHT: 78 IN | RESPIRATION RATE: 18 BRPM | TEMPERATURE: 98 F | BODY MASS INDEX: 23.83 KG/M2 | DIASTOLIC BLOOD PRESSURE: 83 MMHG | WEIGHT: 205.94 LBS | SYSTOLIC BLOOD PRESSURE: 133 MMHG

## 2024-01-02 DIAGNOSIS — N48.6 PEYRONIE DISEASE: Primary | ICD-10-CM

## 2024-01-02 DIAGNOSIS — N52.9 ERECTILE DYSFUNCTION, UNSPECIFIED ERECTILE DYSFUNCTION TYPE: ICD-10-CM

## 2024-01-02 PROCEDURE — 1160F RVW MEDS BY RX/DR IN RCRD: CPT | Mod: CPTII,S$GLB,, | Performed by: UROLOGY

## 2024-01-02 PROCEDURE — 3079F DIAST BP 80-89 MM HG: CPT | Mod: CPTII,S$GLB,, | Performed by: UROLOGY

## 2024-01-02 PROCEDURE — 99204 OFFICE O/P NEW MOD 45 MIN: CPT | Mod: S$GLB,,, | Performed by: UROLOGY

## 2024-01-02 PROCEDURE — 3075F SYST BP GE 130 - 139MM HG: CPT | Mod: CPTII,S$GLB,, | Performed by: UROLOGY

## 2024-01-02 PROCEDURE — 3008F BODY MASS INDEX DOCD: CPT | Mod: CPTII,S$GLB,, | Performed by: UROLOGY

## 2024-01-02 PROCEDURE — 1159F MED LIST DOCD IN RCRD: CPT | Mod: CPTII,S$GLB,, | Performed by: UROLOGY

## 2024-01-02 PROCEDURE — 51798 US URINE CAPACITY MEASURE: CPT | Mod: S$GLB,,, | Performed by: UROLOGY

## 2024-01-02 PROCEDURE — 81003 URINALYSIS AUTO W/O SCOPE: CPT | Mod: QW,S$GLB,, | Performed by: UROLOGY

## 2024-01-02 PROCEDURE — 99999 PR PBB SHADOW E&M-EST. PATIENT-LVL III: CPT | Mod: PBBFAC,,, | Performed by: UROLOGY

## 2024-01-02 NOTE — PROGRESS NOTES
"Chief Complaint:   Encounter Diagnoses   Name Primary?    Peyronie disease Yes    Erectile dysfunction, unspecified erectile dysfunction type        HPI:  HPI Billy Braga lucinda 43 y.o. male who presents with complaints of a knot on his penis that started about 6 months ago.  Patient states initially it was a more prominent area on the middle of the right side of his penis.  This area has improved over the last 6 months however it has a left him with an area where blood flow is not as good.  He was still able to have erections.  He is having some mild discomfort with sex.  He has not having any difficulties urinating.    History:  Social History     Tobacco Use    Smoking status: Every Day     Current packs/day: 0.00     Types: Cigarettes, Vaping with nicotine     Start date: 2020     Last attempt to quit: 9/12/2020     Years since quitting: 3.3   Substance Use Topics    Alcohol use: Yes    Drug use: No     History reviewed. No pertinent past medical history.  Past Surgical History:   Procedure Laterality Date    gastric sleeve       History reviewed. No pertinent family history.    No current outpatient medications on file prior to visit.     No current facility-administered medications on file prior to visit.        Objective:     Vitals:    01/02/24 0807   BP: 133/83   Pulse: 68   Resp: 18   Temp: 97.7 °F (36.5 °C)   TempSrc: Oral   Weight: 93.4 kg (205 lb 14.6 oz)   Height: 6' 6" (1.981 m)      BMI Readings from Last 1 Encounters:   01/02/24 23.80 kg/m²          Physical Exam  No acute distress alert and oriented  Respirations even unlabored   Abdomen is soft nontender   Penis without any lesions   Very subtle right proximal corporal plaque.    Lab Results   Component Value Date    CREATININE 0.9 12/06/2023      Assessment:       1. Peyronie disease    2. Erectile dysfunction, unspecified erectile dysfunction type        Plan:     1. Peyronie disease    2. Erectile dysfunction, unspecified erectile dysfunction " type       Orders Placed This Encounter    POCT Urinalysis, Dipstick, Automated, W/O Scope    POCT Bladder Scan      Reassured that this appears to be mild form of Peyronie's disease.  Recommend treatment for erectile dysfunction if this becomes more prominent.  Suspect something skin change for an additional 6 months.  After discussion the decision was made to just observe this.  They want to reassess in 6 months if it is causing more issues with intercourse.  He will call us with questions concerns or need for another appointment.

## 2024-01-03 LAB
BILIRUB UR QL STRIP: NEGATIVE
GLUCOSE UR QL STRIP: NEGATIVE
KETONES UR QL STRIP: NEGATIVE
LEUKOCYTE ESTERASE UR QL STRIP: NEGATIVE
PH, POC UA: 5.5
POC BLOOD, URINE: POSITIVE
POC NITRATES, URINE: NEGATIVE
POC RESIDUAL URINE VOLUME: 36 ML (ref 0–100)
PROT UR QL STRIP: NEGATIVE
SP GR UR STRIP: 1.03 (ref 1–1.03)
UROBILINOGEN UR STRIP-ACNC: 1 (ref 0.3–2.2)

## 2024-01-18 ENCOUNTER — OFFICE VISIT (OUTPATIENT)
Dept: FAMILY MEDICINE | Facility: CLINIC | Age: 44
End: 2024-01-18
Payer: COMMERCIAL

## 2024-01-18 ENCOUNTER — LAB VISIT (OUTPATIENT)
Dept: LAB | Facility: HOSPITAL | Age: 44
End: 2024-01-18
Attending: FAMILY MEDICINE
Payer: COMMERCIAL

## 2024-01-18 VITALS
DIASTOLIC BLOOD PRESSURE: 84 MMHG | WEIGHT: 208.69 LBS | TEMPERATURE: 99 F | OXYGEN SATURATION: 95 % | BODY MASS INDEX: 24.11 KG/M2 | HEART RATE: 71 BPM | SYSTOLIC BLOOD PRESSURE: 136 MMHG

## 2024-01-18 DIAGNOSIS — R53.83 FATIGUE, UNSPECIFIED TYPE: ICD-10-CM

## 2024-01-18 DIAGNOSIS — I10 ESSENTIAL HYPERTENSION: Primary | ICD-10-CM

## 2024-01-18 DIAGNOSIS — Z98.84 S/P BARIATRIC SURGERY: ICD-10-CM

## 2024-01-18 DIAGNOSIS — N48.6 PEYRONIE'S DISEASE: ICD-10-CM

## 2024-01-18 DIAGNOSIS — K75.81 STEATOHEPATITIS: ICD-10-CM

## 2024-01-18 LAB
ESTIMATED AVG GLUCOSE: 94 MG/DL (ref 68–131)
HBA1C MFR BLD: 4.9 % (ref 4–5.6)
TESTOST SERPL-MCNC: 374 NG/DL (ref 304–1227)

## 2024-01-18 PROCEDURE — 99214 OFFICE O/P EST MOD 30 MIN: CPT | Mod: S$GLB,,, | Performed by: FAMILY MEDICINE

## 2024-01-18 PROCEDURE — 83036 HEMOGLOBIN GLYCOSYLATED A1C: CPT | Performed by: FAMILY MEDICINE

## 2024-01-18 PROCEDURE — 36415 COLL VENOUS BLD VENIPUNCTURE: CPT | Mod: PO | Performed by: FAMILY MEDICINE

## 2024-01-18 PROCEDURE — 99999 PR PBB SHADOW E&M-EST. PATIENT-LVL III: CPT | Mod: PBBFAC,,, | Performed by: FAMILY MEDICINE

## 2024-01-18 PROCEDURE — 3075F SYST BP GE 130 - 139MM HG: CPT | Mod: CPTII,S$GLB,, | Performed by: FAMILY MEDICINE

## 2024-01-18 PROCEDURE — 3008F BODY MASS INDEX DOCD: CPT | Mod: CPTII,S$GLB,, | Performed by: FAMILY MEDICINE

## 2024-01-18 PROCEDURE — 3079F DIAST BP 80-89 MM HG: CPT | Mod: CPTII,S$GLB,, | Performed by: FAMILY MEDICINE

## 2024-01-18 PROCEDURE — 1159F MED LIST DOCD IN RCRD: CPT | Mod: CPTII,S$GLB,, | Performed by: FAMILY MEDICINE

## 2024-01-18 PROCEDURE — 84403 ASSAY OF TOTAL TESTOSTERONE: CPT | Performed by: FAMILY MEDICINE

## 2024-01-18 NOTE — PROGRESS NOTES
Subjective:       Patient ID: Billy Braga is a 43 y.o. male.    Chief Complaint: Follow-up      HPI Comments:     No current outpatient medications on file.      Six week follow-up.    We have been keeping an eye on his blood pressure.  Most of his readings at home have been normal.  It is normal here and it was normal at urologist office.      Saw urology for likely Peyronie's disease.  Will be monitoring this but no treatment for now.    Still complains of fatigue.  Late afternoon testosterone was 318.  Did not discuss this with Urology, but will repeat today with a early morning draw.  Also will check A1c.  Decreased libido is also concern.      No reports of apnea.  Generally feels rested in the morning    He denies any depression    Cautioned him about diet, weight gain.  This could affect his blood pressure in the future.  For now will just monitor him as we have been doing.    His transaminases are normal.  He has steatosis.      Review of Systems   Constitutional:  Positive for fatigue. Negative for activity change, appetite change and fever.   HENT:  Negative for sore throat.    Respiratory:  Negative for cough and shortness of breath.    Cardiovascular:  Negative for chest pain.   Gastrointestinal:  Negative for abdominal pain, diarrhea and nausea.   Genitourinary:  Negative for difficulty urinating.   Musculoskeletal:  Negative for arthralgias and myalgias.   Neurological:  Negative for dizziness and headaches.       Objective:      Vitals:    01/18/24 0806   BP: 136/84   Pulse: 71   Temp: 98.9 °F (37.2 °C)   TempSrc: Tympanic   SpO2: 95%   Weight: 94.7 kg (208 lb 10.7 oz)   PainSc: 0-No pain     Physical Exam  Vitals and nursing note reviewed.   Constitutional:       General: He is not in acute distress.     Appearance: He is well-developed. He is not diaphoretic.   HENT:      Head: Normocephalic.   Neck:      Thyroid: No thyromegaly.   Cardiovascular:      Rate and Rhythm: Normal rate and regular  rhythm.      Heart sounds: Normal heart sounds. No murmur heard.  Pulmonary:      Effort: Pulmonary effort is normal.      Breath sounds: Normal breath sounds. No wheezing or rales.   Abdominal:      General: There is no distension.      Palpations: Abdomen is soft.   Musculoskeletal:      Cervical back: Neck supple.   Lymphadenopathy:      Cervical: No cervical adenopathy.   Skin:     General: Skin is warm and dry.   Neurological:      Mental Status: He is alert and oriented to person, place, and time.   Psychiatric:         Mood and Affect: Mood normal.         Behavior: Behavior normal.         Thought Content: Thought content normal.         Judgment: Judgment normal.         Assessment:       1. Essential hypertension    2. Peyronie's disease    3. S/P bariatric surgery    4. Steatohepatitis    5. Fatigue, unspecified type        Plan:   Essential hypertension  Comments:  Continues to be mostly normal without medication.    Peyronie's disease  Comments:  Followed by urology    S/P bariatric surgery  Comments:  Maintaining healthy weight    Steatohepatitis  Comments:  Normal transaminases    Fatigue, unspecified type  Comments:  Morning testosterone drawn today.  Orders:  -     Testosterone; Future; Expected date: 01/18/2024  -     Hemoglobin A1C; Future; Expected date: 01/18/2024

## 2024-05-17 DIAGNOSIS — R21 RASH OF FOOT: Primary | ICD-10-CM

## 2025-01-08 DIAGNOSIS — I10 ESSENTIAL HYPERTENSION: ICD-10-CM

## 2025-03-06 ENCOUNTER — PATIENT MESSAGE (OUTPATIENT)
Dept: ADMINISTRATIVE | Facility: HOSPITAL | Age: 45
End: 2025-03-06
Payer: COMMERCIAL

## 2025-08-29 ENCOUNTER — PATIENT MESSAGE (OUTPATIENT)
Dept: ADMINISTRATIVE | Facility: HOSPITAL | Age: 45
End: 2025-08-29
Payer: COMMERCIAL